# Patient Record
Sex: MALE | URBAN - METROPOLITAN AREA
[De-identification: names, ages, dates, MRNs, and addresses within clinical notes are randomized per-mention and may not be internally consistent; named-entity substitution may affect disease eponyms.]

---

## 2018-01-01 ENCOUNTER — ANTICOAGULATION THERAPY VISIT (OUTPATIENT)
Dept: NURSING | Facility: CLINIC | Age: 83
End: 2018-01-01

## 2018-01-01 ENCOUNTER — TELEPHONE (OUTPATIENT)
Dept: NURSING | Facility: CLINIC | Age: 83
End: 2018-01-01

## 2018-01-01 ENCOUNTER — ANTICOAGULATION THERAPY VISIT (OUTPATIENT)
Dept: NURSING | Facility: CLINIC | Age: 83
End: 2018-01-01
Payer: COMMERCIAL

## 2018-01-01 ENCOUNTER — TELEPHONE (OUTPATIENT)
Dept: FAMILY MEDICINE | Facility: CLINIC | Age: 83
End: 2018-01-01

## 2018-01-01 ENCOUNTER — OFFICE VISIT (OUTPATIENT)
Dept: FAMILY MEDICINE | Facility: CLINIC | Age: 83
End: 2018-01-01
Payer: COMMERCIAL

## 2018-01-01 ENCOUNTER — OFFICE VISIT (OUTPATIENT)
Dept: DERMATOLOGY | Facility: CLINIC | Age: 83
End: 2018-01-01
Payer: COMMERCIAL

## 2018-01-01 ENCOUNTER — TELEPHONE (OUTPATIENT)
Dept: DERMATOLOGY | Facility: CLINIC | Age: 83
End: 2018-01-01

## 2018-01-01 VITALS
WEIGHT: 200 LBS | HEIGHT: 72 IN | OXYGEN SATURATION: 98 % | BODY MASS INDEX: 27.09 KG/M2 | HEART RATE: 92 BPM | DIASTOLIC BLOOD PRESSURE: 80 MMHG | SYSTOLIC BLOOD PRESSURE: 130 MMHG | TEMPERATURE: 97.5 F

## 2018-01-01 VITALS — SYSTOLIC BLOOD PRESSURE: 158 MMHG | HEART RATE: 80 BPM | DIASTOLIC BLOOD PRESSURE: 70 MMHG | OXYGEN SATURATION: 100 %

## 2018-01-01 DIAGNOSIS — D48.5 NEOPLASM OF UNCERTAIN BEHAVIOR OF SKIN: Primary | ICD-10-CM

## 2018-01-01 DIAGNOSIS — I50.32 CHRONIC DIASTOLIC HF (HEART FAILURE) (H): ICD-10-CM

## 2018-01-01 DIAGNOSIS — D48.5 NEOPLASM OF UNCERTAIN BEHAVIOR OF SKIN: ICD-10-CM

## 2018-01-01 DIAGNOSIS — M54.2 CERVICALGIA: ICD-10-CM

## 2018-01-01 DIAGNOSIS — L57.0 AK (ACTINIC KERATOSIS): Primary | ICD-10-CM

## 2018-01-01 DIAGNOSIS — L57.8 SOLAR ELASTOSIS: ICD-10-CM

## 2018-01-01 DIAGNOSIS — Z85.828 HISTORY OF SKIN CANCER: ICD-10-CM

## 2018-01-01 DIAGNOSIS — I48.20 CHRONIC ATRIAL FIBRILLATION (H): Primary | ICD-10-CM

## 2018-01-01 DIAGNOSIS — I10 ESSENTIAL HYPERTENSION: ICD-10-CM

## 2018-01-01 LAB
COPATH REPORT: NORMAL
INR POINT OF CARE: 2.8 (ref 0.86–1.14)
INR PPP: 2.1
INR PPP: 2.8 (ref 0.86–1.14)

## 2018-01-01 PROCEDURE — 99214 OFFICE O/P EST MOD 30 MIN: CPT | Performed by: FAMILY MEDICINE

## 2018-01-01 PROCEDURE — 11100 HC DESTRUCT PREMALIGNANT LESION, FIRST: CPT | Mod: 59 | Performed by: PHYSICIAN ASSISTANT

## 2018-01-01 PROCEDURE — 11101 HC BIOPSY SKIN/SUBQ/MUC MEM, SINGLE LESION: CPT | Performed by: PHYSICIAN ASSISTANT

## 2018-01-01 PROCEDURE — 17000 DESTRUCT PREMALG LESION: CPT | Mod: 51 | Performed by: PHYSICIAN ASSISTANT

## 2018-01-01 PROCEDURE — 88305 TISSUE EXAM BY PATHOLOGIST: CPT | Mod: TC | Performed by: PHYSICIAN ASSISTANT

## 2018-01-01 PROCEDURE — 17003 DESTRUCT PREMALG LES 2-14: CPT | Performed by: PHYSICIAN ASSISTANT

## 2018-01-01 PROCEDURE — 99203 OFFICE O/P NEW LOW 30 MIN: CPT | Mod: 25 | Performed by: PHYSICIAN ASSISTANT

## 2018-01-01 PROCEDURE — 36416 COLLJ CAPILLARY BLOOD SPEC: CPT | Performed by: FAMILY MEDICINE

## 2018-01-01 PROCEDURE — 85610 PROTHROMBIN TIME: CPT | Performed by: FAMILY MEDICINE

## 2018-01-01 ASSESSMENT — PAIN SCALES - GENERAL: PAINLEVEL: NO PAIN (0)

## 2018-01-19 ENCOUNTER — MEDICAL CORRESPONDENCE (OUTPATIENT)
Dept: HEALTH INFORMATION MANAGEMENT | Facility: CLINIC | Age: 83
End: 2018-01-19

## 2018-01-22 ENCOUNTER — MEDICAL CORRESPONDENCE (OUTPATIENT)
Dept: HEALTH INFORMATION MANAGEMENT | Facility: CLINIC | Age: 83
End: 2018-01-22

## 2018-01-24 ENCOUNTER — MEDICAL CORRESPONDENCE (OUTPATIENT)
Dept: HEALTH INFORMATION MANAGEMENT | Facility: CLINIC | Age: 83
End: 2018-01-24

## 2018-01-29 ENCOUNTER — OFFICE VISIT (OUTPATIENT)
Dept: FAMILY MEDICINE | Facility: CLINIC | Age: 83
End: 2018-01-29
Payer: COMMERCIAL

## 2018-01-29 VITALS
SYSTOLIC BLOOD PRESSURE: 118 MMHG | HEART RATE: 60 BPM | TEMPERATURE: 98 F | DIASTOLIC BLOOD PRESSURE: 60 MMHG | OXYGEN SATURATION: 99 % | WEIGHT: 195.6 LBS

## 2018-01-29 DIAGNOSIS — J18.9 PNEUMONIA DUE TO INFECTIOUS ORGANISM, UNSPECIFIED LATERALITY, UNSPECIFIED PART OF LUNG: Primary | ICD-10-CM

## 2018-01-29 DIAGNOSIS — C61 PROSTATE CANCER (H): ICD-10-CM

## 2018-01-29 DIAGNOSIS — I10 ESSENTIAL HYPERTENSION: ICD-10-CM

## 2018-01-29 DIAGNOSIS — I48.20 CHRONIC ATRIAL FIBRILLATION (H): ICD-10-CM

## 2018-01-29 PROCEDURE — 99203 OFFICE O/P NEW LOW 30 MIN: CPT | Performed by: FAMILY MEDICINE

## 2018-01-29 RX ORDER — LISINOPRIL 10 MG/1
10 TABLET ORAL AT BEDTIME
COMMUNITY

## 2018-01-29 RX ORDER — METOPROLOL SUCCINATE 25 MG/1
25 TABLET, EXTENDED RELEASE ORAL AT BEDTIME
COMMUNITY

## 2018-01-29 RX ORDER — WARFARIN SODIUM 2.5 MG/1
2.5 TABLET ORAL DAILY
Qty: 90 TABLET | Refills: 3 | Status: SHIPPED | OUTPATIENT
Start: 2018-01-29 | End: 2019-01-01

## 2018-01-29 RX ORDER — LACTOBACILLUS RHAMNOSUS GG 10B CELL
1 CAPSULE ORAL DAILY
COMMUNITY
End: 2019-01-01

## 2018-01-29 RX ORDER — ATORVASTATIN CALCIUM 20 MG/1
20 TABLET, FILM COATED ORAL AT BEDTIME
COMMUNITY

## 2018-01-29 RX ORDER — WARFARIN SODIUM 2.5 MG/1
2.5 TABLET ORAL DAILY
COMMUNITY
End: 2018-01-29

## 2018-01-29 NOTE — NURSING NOTE
Chief Complaint   Patient presents with     [facility]     F/u Nursing Home       Initial /60 (BP Location: Right arm, Patient Position: Sitting, Cuff Size: Adult Large)  Pulse 60  Temp 98  F (36.7  C) (Oral)  Wt 195 lb 9.6 oz (88.7 kg)  SpO2 99% There is no height or weight on file to calculate BMI.  Medication Reconciliation: complete

## 2018-01-29 NOTE — PROGRESS NOTES
SUBJECTIVE:   Bret Merino is a 89 year old male who presents to clinic today for the following health issues:      New Patient/Transfer of Care      Hospital Follow-up Visit:    Hospital/Nursing Home/ Rehab Facility: [facility]  Date of Admission: 1/5/2018  Date of Discharge: 1/10/2018  Reason(s) for Admission: fall and pneumonia            Problems taking medications regularly:  None       Medication changes since discharge: None       Problems adhering to non-medication therapy:  None     Summary of hospitalization:  [facility] discharge summary reviewed  Discharge summary unavailable  Diagnostic Tests/Treatments reviewed.  Follow up needed: discharged to [facility] and was seen by in house doctor there until discharge to home on 1/22/2018  Other Healthcare Providers Involved in Patient s Care:         cardiology at [facility], long term  Update since discharge: improved.      Post Discharge Medication Reconciliation: discharge medications reconciled, continue medications without change.  Plan of care communicated with patient and family     Coding guidelines for this visit:  Type of Medical   Decision Making Face-to-Face Visit       within 7 Days of discharge Face-to-Face Visit        within 14 days of discharge   Moderate Complexity [id number]   High Complexity [id number]          This is a patient who is here with his son today.  I have met him once previously and he is here to establish care.  He lives in a senior apartment building/assisted living facility in [facility].  His son sees him on a regular basis and sets up all of his medications.  Recently the patient was hospitalized after a fall and diagnosed with pneumonia.  He was inpatient at [facility] from January 5 - January 10 and then discharged to [facility] where he remained for rehabilitation until her discharge home on January 22, 2018.  Records from his recent hospitalization  and my previous interaction with him are not available through care everywhere.  Long-term medications have been stable and were updated in our electronic record today.  He was discharged from [facility] on Eliquis but this is too costly and he would like to go back on warfarin at this time.  Previously there was a service he would come to his home to draw blood for the INR and the son would like to get that set back up.  He is not had any major bleeding complications and did not have any type of embolic event to suggest failure of warfarin therapy.  Today, Lonnie, denies any pain.  He said his appetite is good.  He has not had any recent falls.  He is continent of bowel and bladder and this is confirmed by his son.  Some intermittent problems with constipation.  He just says his energy level is not quite what he would like it to be and is not sure if that is just attributed to his age or his recent illness.        Problem list and histories reviewed & adjusted, as indicated.  Additional history: as documented    Patient Active Problem List   Diagnosis     Afib (H)     Anticoagulant long-term use     Atrial flutter (H)     Coronary atherosclerosis     Cardiac conduction disorder     Chronic diastolic HF (heart failure) (H)     DJD (degenerative joint disease)     Dyslipidemia     HTN (hypertension)     ICD (implantable cardioverter-defibrillator), biventricular, in situ     Post PTCA     Pre-diabetes     Prostate cancer (H)     Past Surgical History:   Procedure Laterality Date     ARTHROPLASTY KNEE BILATERAL       ARTHROPLASTY SHOULDER Right      AS TOTAL HIP ARTHROPLASTY Right      H CORONARY INTERVENTION      stentin for CAD     IMPLANT PACEMAKER       ORCHIECTOMY SCROTAL BILATERAL      treatment of prostate cancer       Social History   Substance Use Topics     Smoking status: Never Smoker     Smokeless tobacco: Never Used     Alcohol use No     History reviewed. No pertinent family history.      Current  Outpatient Prescriptions   Medication Sig Dispense Refill     lactobacillus rhamnosus, GG, (CULTURELL) capsule Take 1 capsule by mouth daily       atorvastatin (LIPITOR) 20 MG tablet Take 20 mg by mouth daily       lisinopril (PRINIVIL/ZESTRIL) 10 MG tablet Take 10 mg by mouth daily       metoprolol succinate (TOPROL-XL) 25 MG 24 hr tablet Take 25 mg by mouth daily       warfarin (JANTOVEN) 2.5 MG tablet Take 1 tablet (2.5 mg) by mouth daily As directed 90 tablet 3     [DISCONTINUED] warfarin (JANTOVEN) 2.5 MG tablet Take 2.5 mg by mouth daily As directed       Allergies   Allergen Reactions     Aleve [Naproxen]      BP Readings from Last 3 Encounters:   01/29/18 118/60    Wt Readings from Last 3 Encounters:   01/29/18 195 lb 9.6 oz (88.7 kg)                    Reviewed and updated as needed this visit by clinical staff  Tobacco  Allergies  Meds  Problems  Med Hx  Surg Hx  Fam Hx  Soc Hx        Reviewed and updated as needed this visit by Provider  Tobacco  Allergies  Meds  Problems  Med Hx  Surg Hx  Fam Hx  Soc Hx          ROS:  Constitutional, HEENT, cardiovascular, pulmonary, gi and gu systems are negative, except as otherwise noted.    OBJECTIVE:     /60 (BP Location: Right arm, Patient Position: Sitting, Cuff Size: Adult Large)  Pulse 60  Temp 98  F (36.7  C) (Oral)  Wt 195 lb 9.6 oz (88.7 kg)  SpO2 99%  There is no height or weight on file to calculate BMI.  GENERAL: healthy, alert and no distress  EYES: Eyes grossly normal to inspection, PERRL and conjunctivae and sclerae normal  NECK: no adenopathy, no asymmetry, masses, or scars and thyroid normal to palpation  RESP: lungs clear to auscultation - no rales, rhonchi or wheezes  CV: regular rate and rhythm, normal S1 S2, no S3 or S4, no murmur, click or rub, no peripheral edema and peripheral pulses strong  ABDOMEN: soft, nontender, no hepatosplenomegaly, no masses and bowel sounds normal  MS: no gross musculoskeletal defects noted, no  edema  NEURO: Normal strength and tone, mentation intact and speech normal  PSYCH: mentation appears normal, affect normal/bright    Diagnostic Test Results:  none     ASSESSMENT/PLAN:             1. Pneumonia due to infectious organism, unspecified laterality, unspecified part of lung  Clinically his pneumonia has cleared.  Indications for follow-up were briefly reviewed.  Release form to get old records from [facility] was completed today.    2. Chronic atrial fibrillation (H)  He has a biventricular pacemaker in.  This was most recently interrogated in early January 2018.  It showed that he is paced approximately 94% of the time.  His pulse is regular today as his his heartbeat to auscultation.  If he remains in atrial fibrillation then anticoagulation seems appropriate.  They are going to finish the Eliquis and then start his warfarin at 2.5 mg daily which had been a chronic stable dose.  The son will work on getting INR checks set up and they were able to meet briefly today with the INR nurse at the office.  A consent to communicate form has been completed to allow INR results and any medication changes to be communicated to his son Len.  - warfarin (JANTOVEN) 2.5 MG tablet; Take 1 tablet (2.5 mg) by mouth daily As directed  Dispense: 90 tablet; Refill: 3    3. Essential hypertension  Under control at this time.  No refills were needed.    4. Prostate cancer (H)  Asymptomatic at this time.  Has had bilateral orchiectomy for treatment in the past.      FUTURE APPOINTMENTS:       - Follow-up visit in 4 weeks    Ad Brenner MD  [facility]

## 2018-01-29 NOTE — PATIENT INSTRUCTIONS
[facility]   Discharged by : payal  Paper scripts provided to patient : none     If you have any questions regarding your visit please contact your care team:     Team Gold                Clinic Hours Telephone Number     Dr. Alma Rosa Quispe 7am-7pm  Monday - Thursday   7am-5pm  Fridays  [phone]   (Appointment scheduling available 24/7)     RN Line  [phone] option 2     [facility] [place] [facility] 11am-9pm Monday-Friday Saturday-Sunday- 9am-5pm     [place] -   5pm-9pm Monday-Friday Saturday-Sunday- 9am-5pm    [phone] - [place]    [phone] - [place]       For a Price Quote for your services, please call our [email or web address] Price Line at [phone].     What options do I have for visits at the clinic other than the traditional office visit?     To expand how we care for you, many of our providers are utilizing electronic visits (e-visits) and telephone visits, when medically appropriate, for interactions with their patients rather than a visit in the clinic. We also offer nurse visits for many medical concerns. Just like any other service, we will bill your insurance company for this type of visit based on time spent on the phone with your provider. Not all insurance companies cover these visits. Please check with your medical insurance if this type of visit is covered. You will be responsible for any charges that are not paid by your insurance.   E-visits via [employer]: generally incur a $35.00 fee.     Telephone visits:   Time spent on the phone: *charged based on time that is spent on the phone in increments of 10 minutes. Estimated cost:   5-10 mins $30.00   11-20 mins. $59.00   21-30 mins. $85.00     Use [employer]ather (secure email communication and access to your chart) to send your primary care provider a message or make an appointment. Ask someone on your  Team how to sign up for [employer].     As always, Thank you for trusting us with your health care needs!      [facility]:    Scheduling Appointments  [place] [place]  Call: [phone]    [facility] Esc[facility]  Call: [phone]    [facility]  Call: [phone]    For Gastroenterology referrals   [facility]   Clinics and Surgery Center, 4th Floor   [address] [place] [address]   Appointments: [phone]    WHERE TO GO FOR CARE?  Clinic    Make an appointment if you:       Are sick (cold, cough, flu, sore throat, earache or in pain).       Have a small injury (sprain, small cut, burn or broken bone).       Need a physical exam, Pap smear, vaccine or prescription refill.       Have questions about your health or medicines.    To reach us:      Call [phone] ([phone]). Open 24 hours every day. (For counseling services, call [phone].)    Log into Dartfish at [email or web address]. (Visit [email or web address] to create an account.) Hospital emergency room    An emergency is a serious or life- threatening problem that must be treated right away.    Call 355 or get to the hospital if you have:      Very bad or sudden:            - Chest pain or pressure         - Bleeding         - Head or belly pain         - Dizziness or trouble seeing, walking or                          Speaking      Problems breathing      Blood in your vomit or you are coughing up blood      A major injury (knocked out, loss of a finger or limb, rape, broken bone protruding from skin)    A mental health crisis. (Or call the Mental Health Crisis line at 1-321.863.2404 or Suicide Prevention Hotline at 1-935.141.6270.)    Open 24 hours every day. You don't need an appointment.     Urgent care    Visit urgent care for sickness or small injuries when the clinic is closed. You don't need an appointment. To check hours or find an  urgent care near you, visit [email or web address]. Online care    Get online care from [facility] for more than 70 common problems, like colds, allergies and infections. Open 24 hours every day at:   [email or web address]   Need help deciding?    For advice about where to be seen, you may call your clinic and ask to speak with a nurse. We're here for you 24 hours every day.         If you are deaf or hard of hearing, please let us know. We provide many free services including sign language interpreters, oral interpreters, TTYs, telephone amplifiers, note takers and written materials.

## 2018-02-09 ENCOUNTER — TELEPHONE (OUTPATIENT)
Dept: FAMILY MEDICINE | Facility: CLINIC | Age: 83
End: 2018-02-09

## 2018-02-09 DIAGNOSIS — I48.20 CHRONIC ATRIAL FIBRILLATION (H): Primary | ICD-10-CM

## 2018-02-09 NOTE — TELEPHONE ENCOUNTER
Reason for Call: Request for an order or referral:    Order or referral being requested: Restart INR care    Date needed: as soon as possible    Has the patient been seen by the PCP for this problem? Unsure    Additional comments: Ling calling from [facility].  Patient's son called them to re-start INR care.  They are looking for an order.  Would like a standing order with a beginning and end date.  Please fax to [phone].  Please call with questions.    Phone number Patient can be reached at:  Other phone number:  [phone]    Best Time:  any    Can we leave a detailed message on this number?  YES    Call taken on 2/9/2018 at 12:17 PM by Kathia Zhou

## 2018-02-12 NOTE — TELEPHONE ENCOUNTER
INR referral order placed that can be faxed to the home care company doing the lab draws.    Ad Brenner MD

## 2018-02-12 NOTE — TELEPHONE ENCOUNTER
Tc please fax referral to [facility] [phone]  Shelley Ceron,Clinic Rn  [place]

## 2018-02-21 ENCOUNTER — MEDICAL CORRESPONDENCE (OUTPATIENT)
Dept: HEALTH INFORMATION MANAGEMENT | Facility: CLINIC | Age: 83
End: 2018-02-21

## 2018-02-26 ENCOUNTER — OFFICE VISIT (OUTPATIENT)
Dept: FAMILY MEDICINE | Facility: CLINIC | Age: 83
End: 2018-02-26
Payer: COMMERCIAL

## 2018-02-26 VITALS
SYSTOLIC BLOOD PRESSURE: 138 MMHG | WEIGHT: 193.2 LBS | DIASTOLIC BLOOD PRESSURE: 70 MMHG | RESPIRATION RATE: 20 BRPM | BODY MASS INDEX: 26.17 KG/M2 | HEIGHT: 72 IN | HEART RATE: 82 BPM | TEMPERATURE: 97.7 F | OXYGEN SATURATION: 98 %

## 2018-02-26 DIAGNOSIS — I50.32 CHRONIC DIASTOLIC HF (HEART FAILURE) (H): ICD-10-CM

## 2018-02-26 DIAGNOSIS — M47.812 SPONDYLOSIS OF CERVICAL REGION WITHOUT MYELOPATHY OR RADICULOPATHY: ICD-10-CM

## 2018-02-26 DIAGNOSIS — I48.20 CHRONIC ATRIAL FIBRILLATION (H): Primary | ICD-10-CM

## 2018-02-26 DIAGNOSIS — Z79.01 ANTICOAGULANT LONG-TERM USE: ICD-10-CM

## 2018-02-26 PROCEDURE — 99214 OFFICE O/P EST MOD 30 MIN: CPT | Performed by: FAMILY MEDICINE

## 2018-02-26 ASSESSMENT — PAIN SCALES - GENERAL: PAINLEVEL: SEVERE PAIN (6)

## 2018-02-26 NOTE — MR AVS SNAPSHOT
"              After Visit Summary   2/26/2018    Bret Merino    MRN: [id number]           Patient Information     Date Of Birth          6/17/1925        Visit Information        Provider Department      2/26/2018 10:00 AM Ad Brenner MD [facility]        Today's Diagnoses     Chronic atrial fibrillation (H)    -  1    Anticoagulant long-term use        Chronic diastolic HF (heart failure) (H)        Spondylosis of cervical region without myelopathy or radiculopathy           Follow-ups after your visit        Follow-up notes from your care team     Return in about 3 months (around 5/26/2018) for Routine Visit.      Who to contact     If you have questions or need follow up information about today's clinic visit or your schedule please contact [facility] directly at [phone].  Normal or non-critical lab and imaging results will be communicated to you by MyChart, letter or phone within 4 business days after the clinic has received the results. If you do not hear from us within 7 days, please contact the clinic through MyChart or phone. If you have a critical or abnormal lab result, we will notify you by phone as soon as possible.  Submit refill requests through [employer] or call your pharmacy and they will forward the refill request to us. Please allow 3 business days for your refill to be completed.          Additional Information About Your Visit        MyChart Information     [employer] lets you send messages to your doctor, view your test results, renew your prescriptions, schedule appointments and more. To sign up, go to [email or web address] . Click on \"Log in\" on the left side of the screen, which will take you to the Welcome page. Then click on \"Sign up Now\" on the right side of the page.     You will be asked to enter the access code listed below, as well as some personal information. Please follow the directions to create your username and password.   "   Your access code is: [email or web address]  Expires: 2018 11:16 AM     Your access code will  in 90 days. If you need help or a new code, please call your [place] clinic or [phone].        Care EveryWhere ID     This is your Care EveryWhere ID. This could be used by other organizations to access your [place] medical records  [id number]        Your Vitals Were     Pulse Temperature Respirations Height Pulse Oximetry BMI (Body Mass Index)    82 97.7  F (36.5  C) (Tympanic) 20 6' (1.829 m) 98% 26.2 kg/m2       Blood Pressure from Last 3 Encounters:   18 138/70   18 118/60    Weight from Last 3 Encounters:   18 193 lb 3.2 oz (87.6 kg)   18 195 lb 9.6 oz (88.7 kg)              Today, you had the following     No orders found for display       Primary Care Provider Office Phone # Fax #    Ad Brenner [phone]       [address] [address]        Equal Access to Services     [facility]: Hadii aad ku hadasho Soomaali, waaxda luqadaha, qaybta kaalmada adeegyada, amber rtinidad haygerryn yessenia beebe . So [facility] [phone].    ATENCIÓN: Si habla español, tiene a medina disposición servicios gratuitos de asistencia lingüística. Llame al [phone].    We comply with applicable federal civil rights laws and [place] laws. We do not discriminate on the basis of race, color, national origin, age, disability, sex, sexual orientation, or gender identity.            Thank you!     Thank you for choosing [facility]  for your care. Our goal is always to provide you with excellent care. Hearing back from our patients is one way we can continue to improve our services. Please take a few minutes to complete the written survey that you may receive in the mail after your visit with us. Thank you!             Your Updated Medication List - Protect others around you: Learn how to safely use, store and throw away your medicines  at www.disposemymeds.org.          This list is accurate as of 2/26/18 10:33 AM.  Always use your most recent med list.                   Brand Name Dispense Instructions for use Diagnosis    ASPIRIN NOT PRESCRIBED    INTENTIONAL    0 each    Please choose reason not prescribed, below    Chronic atrial fibrillation (H)       lactobacillus rhamnosus (GG) capsule      Take 1 capsule by mouth daily        LIPITOR 20 MG tablet   Generic drug:  atorvastatin      Take 20 mg by mouth daily        lisinopril 10 MG tablet    PRINIVIL/ZESTRIL     Take 10 mg by mouth daily        metoprolol succinate 25 MG 24 hr tablet    TOPROL-XL     Take 25 mg by mouth daily        warfarin 2.5 MG tablet    JANTOVEN    90 tablet    Take 1 tablet (2.5 mg) by mouth daily As directed    Chronic atrial fibrillation (H)

## 2018-02-26 NOTE — NURSING NOTE
Chief Complaint   Patient presents with     Pain     neck pain/ hx of neck fracture        Initial /70 (BP Location: Right arm, Patient Position: Chair, Cuff Size: Adult Large)  Pulse 82  Temp 97.7  F (36.5  C) (Tympanic)  Resp 20  Ht 6' (1.829 m)  Wt 193 lb 3.2 oz (87.6 kg)  SpO2 98%  BMI 26.2 kg/m2 Estimated body mass index is 26.2 kg/(m^2) as calculated from the following:    Height as of this encounter: 6' (1.829 m).    Weight as of this encounter: 193 lb 3.2 oz (87.6 kg).  Medication Reconciliation: complete       Olivia Chapman CMA

## 2018-02-26 NOTE — PROGRESS NOTES
SUBJECTIVE:   Bret Merino is a 89 year old male who presents to clinic today for the following health issues:      Recheck from last visit,     INR done 2/20/2018, result per patient 3.3        Amount of exercise or physical activity: 4-5 days/week for an average of 15-30 minutes    Problems taking medications regularly: No    Medication side effects: none    Diet: regular (no restrictions)    Patient is here for a recheck today.  He is doing well and son feels he is pretty much back to normal at this time.  Had INR done on 2/20 and there was some confusion as to where the test results went so son will be working on that.  Appetite good.  Sleep good.  Some axial neck pain on the left side bothers at times and improved with tylenol.  No radicular symptoms, no incontinence.  No other concerns noted today.      Problem list and histories reviewed & adjusted, as indicated.  Additional history: as documented    Patient Active Problem List   Diagnosis     Afib (H)     Anticoagulant long-term use     Atrial flutter (H)     Coronary atherosclerosis     Cardiac conduction disorder     Chronic diastolic HF (heart failure) (H)     DJD (degenerative joint disease)     Dyslipidemia     HTN (hypertension)     ICD (implantable cardioverter-defibrillator), biventricular, in situ     Post PTCA     Pre-diabetes     Prostate cancer (H)     Past Surgical History:   Procedure Laterality Date     ARTHROPLASTY KNEE BILATERAL       ARTHROPLASTY SHOULDER Right      AS TOTAL HIP ARTHROPLASTY Right      H CORONARY INTERVENTION      stentin for CAD     IMPLANT PACEMAKER       ORCHIECTOMY SCROTAL BILATERAL      treatment of prostate cancer       Social History   Substance Use Topics     Smoking status: Never Smoker     Smokeless tobacco: Never Used     Alcohol use No     No family history on file.      Current Outpatient Prescriptions   Medication Sig Dispense Refill     lactobacillus rhamnosus, GG, (CULTURELL) capsule Take 1 capsule by  mouth daily       atorvastatin (LIPITOR) 20 MG tablet Take 20 mg by mouth daily       lisinopril (PRINIVIL/ZESTRIL) 10 MG tablet Take 10 mg by mouth daily       metoprolol succinate (TOPROL-XL) 25 MG 24 hr tablet Take 25 mg by mouth daily       warfarin (JANTOVEN) 2.5 MG tablet Take 1 tablet (2.5 mg) by mouth daily As directed 90 tablet 3     ASPIRIN NOT PRESCRIBED (INTENTIONAL) Please choose reason not prescribed, below 0 each 0     Allergies   Allergen Reactions     Aleve [Naproxen]        Reviewed and updated as needed this visit by clinical staff  Tobacco  Allergies  Meds  Problems       Reviewed and updated as needed this visit by Provider  Allergies  Meds  Problems         ROS:  Constitutional, HEENT, cardiovascular, pulmonary, gi and gu systems are negative, except as otherwise noted.    OBJECTIVE:     /70 (BP Location: Right arm, Patient Position: Chair, Cuff Size: Adult Large)  Pulse 82  Temp 97.7  F (36.5  C) (Tympanic)  Resp 20  Ht 6' (1.829 m)  Wt 193 lb 3.2 oz (87.6 kg)  SpO2 98%  BMI 26.2 kg/m2  Body mass index is 26.2 kg/(m^2).  GENERAL: healthy, alert and no distress  EYES: Eyes grossly normal to inspection, PERRL and conjunctivae and sclerae normal  NECK: no adenopathy, no asymmetry, masses, or scars and thyroid normal to palpation  RESP: lungs clear to auscultation - no rales, rhonchi or wheezes  CV: regular rate and rhythm, normal S1 S2, no S3 or S4, no murmur, click or rub, no peripheral edema and peripheral pulses strong  MS: no gross musculoskeletal defects noted, no edema  NEURO: Normal strength and tone, mentation intact and speech normal  PSYCH: mentation appears normal, affect normal/bright    Diagnostic Test Results:  none     ASSESSMENT/PLAN:             1. Chronic atrial fibrillation (H)  Clinically in rate control at this time.  On coumadin and will work on getting results sent to our office for management.    2. Anticoagulant long-term use  As above.    3. Chronic  diastolic HF (heart failure) (H)  No evidence of fluid overload at this time.    4. Spondylosis of cervical region without myelopathy or radiculopathy  May continue with prn tylenol, likely arthritic in nature based on symptoms today.  Imaging was deferred due to lack of concerning symptoms and improvement with tylenol.      FUTURE APPOINTMENTS:       - Follow-up visit in 3 months    Ad Brenner MD  [facility]

## 2018-03-19 ENCOUNTER — TELEPHONE (OUTPATIENT)
Dept: NURSING | Facility: CLINIC | Age: 83
End: 2018-03-19

## 2018-03-19 NOTE — TELEPHONE ENCOUNTER
Spoke to [facility] they will be drawing inr tomorrow then calling with results, hours and phone number provided. Then spoke to Len the son we will call dose to him.  Shelley Ceron,Clinic Rn  [place]

## 2018-03-21 ENCOUNTER — TELEPHONE (OUTPATIENT)
Dept: FAMILY MEDICINE | Facility: CLINIC | Age: 83
End: 2018-03-21

## 2018-03-21 NOTE — TELEPHONE ENCOUNTER
Forms received from [facility]/ Lab Specimen Acquisition- Lab Order Authorization Request- Finger stick or venipuncture to obtain lab specimen  for Ad Brenner MD.  Forms placed in provider 'sign me' folder.  Please fax forms to [phone] after completion.    Adamaris Van  Patient Representative

## 2018-04-03 ENCOUNTER — TELEPHONE (OUTPATIENT)
Dept: FAMILY MEDICINE | Facility: CLINIC | Age: 83
End: 2018-04-03

## 2018-04-03 NOTE — TELEPHONE ENCOUNTER
Forms received from: [employer]   Phone number listed: [phone]   Fax listed: [phone]  Date received: 4-3-18  Form description: Admission Orders: RN 1 week 1, 2 week 2, 1 week 6  Once forms are completed, please return to [employer] via fax.  Is patient requesting to be contacted when forms are completed: n/a  Form placed: Provider folder    Yusra Dowd

## 2018-04-06 NOTE — TELEPHONE ENCOUNTER
Form was returned because it was unsigned.  Dr Brenner signed all the rest that were stapled together but missed this one form    Plan of Care (01/24/18-03/24/18)    Placed in Dr Calvert sign me box.    Adamaris Van

## 2018-05-08 ENCOUNTER — TELEPHONE (OUTPATIENT)
Dept: FAMILY MEDICINE | Facility: CLINIC | Age: 83
End: 2018-05-08

## 2018-05-08 NOTE — TELEPHONE ENCOUNTER
Forms received from [facility]/ Discharge of Physical Therapy effective as of 02/21/18 for Ad Brenner MD.  Forms placed in provider 'sign me' folder.  Please fax forms to [phone] after completion.    Adamaris Van  Patient Representative

## 2018-05-08 NOTE — TELEPHONE ENCOUNTER
Forms received [facility]/ 2 orders    1)physician orders - son declining SLP intervention - DC SLP eval and treat order         2)Skilled nursing Discharge - effective as of 01/29/2018  for Ad Brenner MD.  Forms placed in provider 'sign me' folder.  Please fax forms to [phone] after completion.    Adamaris Van  Patient Representative

## 2018-05-23 NOTE — MR AVS SNAPSHOT
After Visit Summary   5/23/2018    Bret Merino    MRN: [id number]           Patient Information     Date Of Birth          6/17/1925        Visit Information        Provider Department      5/23/2018 10:20 AM Ad Brenner MD [facility]        Today's Diagnoses     Chronic atrial fibrillation (H)    -  1    Essential hypertension        Chronic diastolic HF (heart failure) (H)        Cervicalgia          Care Instructions    Today your INR was 2.8.    Stay on the same dose and recheck in about one month.    [facility]   Discharged by : payal  Paper scripts provided to patient : none     If you have any questions regarding your visit please contact your care team:     Team Gold                Clinic Hours Telephone Number     Dr. Alma Rosa Collazo, NP 7am-7pm  Monday - Thursday   7am-5pm  Fridays  [phone]   (Appointment scheduling available 24/7)     RN Line  [phone] option 2     Urgent Care - [place] and [facility] - 11am-9pm Monday-Friday Saturday-Sunday- 9am-5pm     [place] -   5pm-9pm Monday-Friday Saturday-Sunday- 9am-5pm    [phone] - [place]    [phone] - [place]       For a Price Quote for your services, please call our Consumer Price Line at [phone].     What options do I have for visits at the clinic other than the traditional office visit?     To expand how we care for you, many of our providers are utilizing electronic visits (e-visits) and telephone visits, when medically appropriate, for interactions with their patients rather than a visit in the clinic. We also offer nurse visits for many medical concerns. Just like any other service, we will bill your insurance company for this type of visit based on time spent on the phone with your provider. Not all insurance companies cover these visits. Please  check with your medical insurance if this type of visit is covered. You will be responsible for any charges that are not paid by your insurance.   E-visits via StrikeIronhart: generally incur a $35.00 fee.     Telephone visits:  Time spent on the phone: *charged based on time that is spent on the phone in increments of 10 minutes. Estimated cost:   5-10 mins $30.00   11-20 mins. $59.00   21-30 mins. $85.00       Use Bitstrips (secure email communication and access to your chart) to send your primary care provider a message or make an appointment. Ask someone on your Team how to sign up for Bitstrips.     As always, Thank you for trusting us with your health care needs!      [facility]:    Scheduling Appointments  [place] [place]  Call: [phone]    [facility]  Call: [phone]    [facility]  Call: [phone]    For Gastroenterology referrals   [facility] Gastroenterology   Clinics and Surgery Center, 4th Floor   [address] [place] [address]   Appointments: [phone]    WHERE TO GO FOR CARE?  Clinic    Make an appointment if you:       Are sick (cold, cough, flu, sore throat, earache or in pain).       Have a small injury (sprain, small cut, burn or broken bone).       Need a physical exam, Pap smear, vaccine or prescription refill.       Have questions about your health or medicines.    To reach us:      Call [phone] ([phone]). Open 24 hours every day. (For counseling services, call [phone].)    Log into Bitstrips at [email or web address]. (Visit [email or web address] to create an account.) Hospital emergency room    An emergency is a serious or life- threatening problem that must be treated right away.    Call [phone] or get to the hospital if you have:      Very bad or sudden:            - Chest pain or pressure         - Bleeding         - Head or belly pain         - Dizziness or trouble seeing,  walking or                          Speaking      Problems breathing      Blood in your vomit or you are coughing up blood      A major injury (knocked out, loss of a finger or limb, rape, broken bone protruding from skin)    A mental health crisis. (Or call the Mental Health Crisis line at 1-863.185.4909 or Suicide Prevention Hotline at 1-414.646.9064.)    Open 24 hours every day. You don't need an appointment.     Urgent care    Visit urgent care for sickness or small injuries when the clinic is closed. You don't need an appointment. To check hours or find an urgent care near you, visit [email or web address]. Online care    Get online care from [facility] for more than 70 common problems, like colds, allergies and infections. Open 24 hours every day at:   [email or web address]   Need help deciding?    For advice about where to be seen, you may call your clinic and ask to speak with a nurse. We're here for you 24 hours every day.         If you are deaf or hard of hearing, please let us know. We provide many free services including sign language interpreters, oral interpreters, TTYs, telephone amplifiers, note takers and written materials.                   Follow-ups after your visit        Follow-up notes from your care team     Return in about 3 months (around 8/23/2018) for Routine Visit.      Who to contact     If you have questions or need follow up information about today's clinic visit or your schedule please contact [facility] directly at [phone].  Normal or non-critical lab and imaging results will be communicated to you by MyChart, letter or phone within 4 business days after the clinic has received the results. If you do not hear from us within 7 days, please contact the clinic through MyChart or phone. If you have a critical or abnormal lab result, we will notify you by phone as soon as possible.  Submit refill requests through [employer] or call your pharmacy and they will forward the refill  request to us. Please allow 3 business days for your refill to be completed.          Additional Information About Your Visit        Care EveryWhere ID     This is your Care EveryWhere ID. This could be used by other organizations to access your [place] medical records  [id number]        Your Vitals Were     Pulse Temperature Height Pulse Oximetry BMI (Body Mass Index)       92 97.5  F (36.4  C) (Oral) 6' (1.829 m) 98% 27.12 kg/m2        Blood Pressure from Last 3 Encounters:   05/23/18 130/80   02/26/18 138/70   01/29/18 118/60    Weight from Last 3 Encounters:   05/23/18 200 lb (90.7 kg)   02/26/18 193 lb 3.2 oz (87.6 kg)   01/29/18 195 lb 9.6 oz (88.7 kg)              We Performed the Following     INR        Primary Care Provider Office Phone # Fax #    Ad Brenner [phone]       [address] [facility] [address]        Equal Access to Services     [facility] : Hadii aad ku hadasho Soomaali, waaxda luqadaha, qaybta kaalmada adeegyada, waxay idiin haygerryn yessenia beebe . So [facility] [phone].    ATENCIÓN: Si habla español, tiene a medina disposición servicios gratuitos de asistencia lingüística. Llame al [phone].    We comply with applicable federal civil rights laws and [place] laws. We do not discriminate on the basis of race, color, national origin, age, disability, sex, sexual orientation, or gender identity.            Thank you!     Thank you for choosing [facility] [place]  for your care. Our goal is always to provide you with excellent care. Hearing back from our patients is one way we can continue to improve our services. Please take a few minutes to complete the written survey that you may receive in the mail after your visit with us. Thank you!             Your Updated Medication List - Protect others around you: Learn how to safely use, store and throw away your medicines at www.disposemymeds.org.          This list is accurate  as of 5/23/18 11:06 AM.  Always use your most recent med list.                   Brand Name Dispense Instructions for use Diagnosis    ASPIRIN NOT PRESCRIBED    INTENTIONAL    0 each    Please choose reason not prescribed, below    Chronic atrial fibrillation (H)       lactobacillus rhamnosus (GG) capsule      Take 1 capsule by mouth daily        LIPITOR 20 MG tablet   Generic drug:  atorvastatin      Take 20 mg by mouth daily        lisinopril 10 MG tablet    PRINIVIL/ZESTRIL     Take 10 mg by mouth daily        metoprolol succinate 25 MG 24 hr tablet    TOPROL-XL     Take 25 mg by mouth daily        warfarin 2.5 MG tablet    JANTOVEN    90 tablet    Take 1 tablet (2.5 mg) by mouth daily As directed    Chronic atrial fibrillation (H)

## 2018-05-23 NOTE — PROGRESS NOTES
ANTICOAGULATION FOLLOW-UP CLINIC VISIT    Patient Name:  Bret Merino  Date:  5/23/2018  Contact Type:  Face to Face    SUBJECTIVE:     Patient Findings     Positives No Problem Findings           OBJECTIVE    INR   Date Value Ref Range Status   05/23/2018 2.80 (H) 0.86 - 1.14 Final     Comment:     This test is intended for monitoring Coumadin therapy.  Results are not   accurate in patients with prolonged INR due to factor deficiency.         ASSESSMENT / PLAN  INR assessment THER    Recheck INR In: 4 WEEKS    INR Location Clinic      Anticoagulation Summary as of 5/23/2018     INR goal 2.0-3.0    Today's INR 2.80    Warfarin maintenance plan 2.5 mg (2.5 mg x 1) every day    Full warfarin instructions 2.5 mg every day    Weekly warfarin total 17.5 mg    No change documented Kelby Miranda RN    Plan last modified Shelley Ceron RN (3/20/2018)    Next INR check 6/25/2018    Target end date       Anticoagulation Episode Summary     INR check location     Preferred lab     Send INR reminders to [facility]    Comments             See the Encounter Report to view Anticoagulation Flowsheet and Dosing Calendar (Go to Encounters tab in chart review, and find the Anticoagulation Therapy Visit)        Kelby Miranda, RN

## 2018-05-23 NOTE — PROGRESS NOTES
SUBJECTIVE:   Bret Merino is a 89 year old male who presents to clinic today for the following health issues:      Follow up from last visit.   INR last checked 3/20/18        Amount of exercise or physical activity: walking from room to dinning smith          Problems taking medications regularly: No    Medication side effects: none    Diet: regular (no restrictions)    He feels well at this time.  Some problems getting INR's done but not on our end, on the home service providers end.      Some neck pain.  Present for years intermittently and usually when he lies down in bed.  Tylenol helpful.  Right side in the post neck, no radiation.  Can interfere with falling asleep.  No injury although reports distant hx of cervical fracture.  Otherwise here with son and no other concerns were noted.  Patient feels quality of life is quite good and likes where he lives.  Engaged in daily activities at the facility but does not leave it much.        Problem list and histories reviewed & adjusted, as indicated.  Additional history: as documented    Patient Active Problem List   Diagnosis     Afib (H)     Anticoagulant long-term use     Atrial flutter (H)     Coronary atherosclerosis     Cardiac conduction disorder     Chronic diastolic HF (heart failure) (H)     DJD (degenerative joint disease)     Dyslipidemia     HTN (hypertension)     ICD (implantable cardioverter-defibrillator), biventricular, in situ     Post PTCA     Pre-diabetes     Prostate cancer (H)     Past Surgical History:   Procedure Laterality Date     ARTHROPLASTY KNEE BILATERAL       ARTHROPLASTY SHOULDER Right      AS TOTAL HIP ARTHROPLASTY Right      H CORONARY INTERVENTION      stentin for CAD     IMPLANT PACEMAKER       ORCHIECTOMY SCROTAL BILATERAL      treatment of prostate cancer       Social History   Substance Use Topics     Smoking status: Never Smoker     Smokeless tobacco: Never Used     Alcohol use No     History reviewed. No pertinent family  history.      Current Outpatient Prescriptions   Medication Sig Dispense Refill     ASPIRIN NOT PRESCRIBED (INTENTIONAL) Please choose reason not prescribed, below 0 each 0     atorvastatin (LIPITOR) 20 MG tablet Take 20 mg by mouth daily       lactobacillus rhamnosus, GG, (CULTURELL) capsule Take 1 capsule by mouth daily       lisinopril (PRINIVIL/ZESTRIL) 10 MG tablet Take 10 mg by mouth daily       metoprolol succinate (TOPROL-XL) 25 MG 24 hr tablet Take 25 mg by mouth daily       warfarin (JANTOVEN) 2.5 MG tablet Take 1 tablet (2.5 mg) by mouth daily As directed 90 tablet 3     Allergies   Allergen Reactions     Aleve [Naproxen]        Reviewed and updated as needed this visit by clinical staff  Tobacco  Allergies  Meds  Problems  Med Hx  Surg Hx  Fam Hx  Soc Hx        Reviewed and updated as needed this visit by Provider  Tobacco  Allergies  Meds  Problems  Med Hx  Surg Hx  Fam Hx  Soc Hx          ROS:  Constitutional, HEENT, cardiovascular, pulmonary, gi and gu systems are negative, except as otherwise noted.    OBJECTIVE:     /80 (BP Location: Right arm, Patient Position: Chair, Cuff Size: Adult Large)  Pulse 92  Temp 97.5  F (36.4  C) (Oral)  Ht 6' (1.829 m)  Wt 200 lb (90.7 kg)  SpO2 98%  BMI 27.12 kg/m2  Body mass index is 27.12 kg/(m^2).  GENERAL: healthy, alert and no distress  EYES: Eyes grossly normal to inspection, PERRL and conjunctivae and sclerae normal  NECK: no adenopathy, no asymmetry, masses, or scars and thyroid normal to palpation  RESP: lungs clear to auscultation - no rales, rhonchi or wheezes  CV: regular rate and rhythm, normal S1 S2, no S3 or S4, no murmur, click or rub, no peripheral edema and peripheral pulses strong  CV: presumed paced rhythm  ABDOMEN: soft, nontender, no hepatosplenomegaly, no masses and bowel sounds normal  MS: no gross musculoskeletal defects noted, no edema  NEURO: Normal strength and tone, mentation intact and speech normal  PSYCH:  mentation appears normal, affect normal/bright    Diagnostic Test Results:  Results for orders placed or performed in visit on 05/23/18 (from the past 24 hour(s))   INR   Result Value Ref Range    INR 2.80 (H) 0.86 - 1.14       ASSESSMENT/PLAN:             1. Chronic atrial fibrillation (H)  INR done today, stable, same dose, repeat one month.  Rate controlled and no other symptoms at this time.  No recent falls.  - INR    2. Essential hypertension  Under control at this time, no changes.    3. Chronic diastolic HF (heart failure) (H)  Stable and appears euvolemic.  No change in respiratory status.    4. Cervicalgia  Most likely DDD in cause.  Tylenol OK.  Talked about switching to a more supportive pillow as he sleeps on his back and will try that first.  No interested in other interventions at this time.      FUTURE APPOINTMENTS:       - Follow-up visit in 3-4 months, sooner prn.    Ad Brenner MD  [facility]

## 2018-05-23 NOTE — MR AVS SNAPSHOT
Bret LOY Merino   5/23/2018   Anticoagulation Therapy Visit    Description:  89 year old male   Provider:  Ad Brenner MD   Department:  Ne Nurse           INR as of 5/23/2018     Today's INR 2.80      Anticoagulation Summary as of 5/23/2018     INR goal 2.0-3.0    Today's INR 2.80    Full warfarin instructions 2.5 mg every day    Next INR check 6/25/2018      Contact Numbers     Please call [phone] to cancel and/or reschedule your appointment.  Please call [phone] with any problems or questions regarding your therapy          May 2018 Details    Sun Mon Tue Wed Thu Fri Sat       1               2               3               4               5                 6               7               8               9               10               11               12                 13               14               15               16               17               18               19                 20               21               22               23      2.5 mg   See details      24      2.5 mg         25      2.5 mg         26      2.5 mg           27      2.5 mg         28      2.5 mg         29      2.5 mg         30      2.5 mg         31      2.5 mg            Date Details   05/23 This INR check               How to take your warfarin dose     To take:  2.5 mg Take 1 of the 2.5 mg tablets.           June 2018 Details    Sun Mon Tue Wed Thu Fri Sat          1      2.5 mg         2      2.5 mg           3      2.5 mg         4      2.5 mg         5      2.5 mg         6      2.5 mg         7      2.5 mg         8      2.5 mg         9      2.5 mg           10      2.5 mg         11      2.5 mg         12      2.5 mg         13      2.5 mg         14      2.5 mg         15      2.5 mg         16      2.5 mg           17      2.5 mg         18      2.5 mg         19      2.5 mg         20      2.5 mg         21      2.5 mg         22      2.5 mg         23      2.5 mg           24      2.5  mg         25            26               27               28               29               30                Date Details   No additional details    Date of next INR:  6/25/2018         How to take your warfarin dose     To take:  2.5 mg Take 1 of the 2.5 mg tablets.

## 2018-05-23 NOTE — PATIENT INSTRUCTIONS
Today your INR was 2.8.    Stay on the same dose and recheck in about one month.    [facility]   Discharged by : payal  Paper scripts provided to patient : none     If you have any questions regarding your visit please contact your care team:     Team Gold                Clinic Hours Telephone Number     Dr. Alma Rosa Collazo, NP 7am-7pm  Monday - Thursday   7am-5pm  Fridays  [phone]   (Appointment scheduling available 24/7)     RN Line  [phone] option 2     [facility] [place] [facility] - 11am-9pm Monday-Friday Saturday-Sunday- 9am-5pm     [place] -   5pm-9pm Monday-Friday Saturday-Sunday- 9am-5pm    [phone] - [place]    [phone] - [place]       For a Price Quote for your services, please call our Consumer Price Line at [phone].     What options do I have for visits at the clinic other than the traditional office visit?     To expand how we care for you, many of our providers are utilizing electronic visits (e-visits) and telephone visits, when medically appropriate, for interactions with their patients rather than a visit in the clinic. We also offer nurse visits for many medical concerns. Just like any other service, we will bill your insurance company for this type of visit based on time spent on the phone with your provider. Not all insurance companies cover these visits. Please check with your medical insurance if this type of visit is covered. You will be responsible for any charges that are not paid by your insurance.   E-visits via Beaker: generally incur a $35.00 fee.     Telephone visits:  Time spent on the phone: *charged based on time that is spent on the phone in increments of 10 minutes. Estimated cost:   5-10 mins $30.00   11-20 mins. $59.00   21-30 mins. $85.00       Use Beaker (secure email communication and access to your chart) to  send your primary care provider a message or make an appointment. Ask someone on your Team how to sign up for CicerOOs.     As always, Thank you for trusting us with your health care needs!      [facility]:    Scheduling Appointments  [facility] [place]  Call: [phone]    [facility], [facility]  Call: [phone]    [facility]  Call: [phone]    For Gastroenterology referrals   [facility] Gastroenterology   Clinics and Surgery [place], 4th Floor   [address] [place] [address]   Appointments: [phone]    WHERE TO GO FOR CARE?  Clinic    Make an appointment if you:       Are sick (cold, cough, flu, sore throat, earache or in pain).       Have a small injury (sprain, small cut, burn or broken bone).       Need a physical exam, Pap smear, vaccine or prescription refill.       Have questions about your health or medicines.    To reach us:      Call [phone] ([phone]). Open 24 hours every day. (For counseling services, call [phone].)    Log into CicerOOs at [email or web address]. (Visit [email or web address] to create an account.) Hospital emergency room    An emergency is a serious or life- threatening problem that must be treated right away.    Call 235 or get to the hospital if you have:      Very bad or sudden:            - Chest pain or pressure         - Bleeding         - Head or belly pain         - Dizziness or trouble seeing, walking or                          Speaking      Problems breathing      Blood in your vomit or you are coughing up blood      A major injury (knocked out, loss of a finger or limb, rape, broken bone protruding from skin)    A mental health crisis. (Or call the Mental Health Crisis line at 1-866.475.7568 or Suicide Prevention Hotline at 1-449.599.6440.)    Open 24 hours every day. You don't need an appointment.     Urgent care    Visit urgent care for sickness or small injuries  when the clinic is closed. You don't need an appointment. To check hours or find an urgent care near you, visit [email or web address]. Online care    Get online care from [facility] for more than 70 common problems, like colds, allergies and infections. Open 24 hours every day at:   [email or web address]   Need help deciding?    For advice about where to be seen, you may call your clinic and ask to speak with a nurse. We're here for you 24 hours every day.         If you are deaf or hard of hearing, please let us know. We provide many free services including sign language interpreters, oral interpreters, TTYs, telephone amplifiers, note takers and written materials.

## 2018-08-09 NOTE — MR AVS SNAPSHOT
Bret GRANADO Angelique   8/9/2018   Anticoagulation Therapy Visit    Description:  89 year old male   Provider:  Ad Brenner MD   Department:  Ne Nurse           INR as of 8/9/2018     Today's INR 2.1 (8/8/2018)      Anticoagulation Summary as of 8/9/2018     INR goal 2.0-3.0    Today's INR 2.1 (8/8/2018)    Full warfarin instructions 2.5 mg every day    Next INR check 9/20/2018      Description     Call son if there's a dose change. Call [facility] and schedule next INR. Son would like it to be around the 20th of every month.      Your next Anticoagulation Clinic appointment(s)     Sep 20, 2018  8:45 AM CDT   [facility] with [facility]   [facility]    [address] [place] [id number] [phone]              Contact Numbers     Please call [phone] to cancel and/or reschedule your appointment.  Please call [phone] with any problems or questions regarding your therapy          August 2018 Details    Sun Mon Tue Wed Thu Fri Sat        1               2               3               4                 5               6               7               8               9      2.5 mg   See details      10      2.5 mg         11      2.5 mg           12      2.5 mg         13      2.5 mg         14      2.5 mg         15      2.5 mg         16      2.5 mg         17      2.5 mg         18      2.5 mg           19      2.5 mg         20      2.5 mg         21      2.5 mg         22      2.5 mg         23      2.5 mg         24      2.5 mg         25      2.5 mg           26      2.5 mg         27      2.5 mg         28      2.5 mg         29      2.5 mg         30      2.5 mg         31      2.5 mg           Date Details   08/09 This INR check               How to take your warfarin dose     To take:  2.5 mg Take 1 of the 2.5 mg tablets.           September 2018 Details    Sun Mon Tue Wed Thu Fri Sat           1      2.5 mg            2      2.5 mg         3      2.5 mg         4      2.5 mg         5      2.5 mg         6      2.5 mg         7      2.5 mg         8      2.5 mg           9      2.5 mg         10      2.5 mg         11      2.5 mg         12      2.5 mg         13      2.5 mg         14      2.5 mg         15      2.5 mg           16      2.5 mg         17      2.5 mg         18      2.5 mg         19      2.5 mg         20            21               22                 23               24               25               26               27               28               29                 30                      Date Details   No additional details    Date of next INR:  9/20/2018         How to take your warfarin dose     To take:  2.5 mg Take 1 of the 2.5 mg tablets.

## 2018-11-07 NOTE — TELEPHONE ENCOUNTER
Reason for Call: Request for an order or referral:    Order or referral being requested:  Patients son, Len,  is calling on behalf of patient.  Patient has a spot on his head that he is worried about. Patient would like a referral to see a dermatologist. Would like the referral to be within the [employer] system.      Date needed: as soon as possible    Has the patient been seen by the PCP for this problem? NO    Additional comments:     Phone number Patient can be reached at:  Other phone number:  [phone]/ barry Harrington    Best Time:  any    Can we leave a detailed message on this number?  YES    Call taken on 11/7/2018 at 9:53 AM by Adamaris Van

## 2018-11-07 NOTE — TELEPHONE ENCOUNTER
Referral placed for some [facility] locations in the [facility].  Please call son to let him know and provide numbers that he can use to call and schedule.    Ad Brenner MD

## 2018-11-07 NOTE — TELEPHONE ENCOUNTER
Route request for dermatology referral to PCP.  Last visit was 5/23/18. I do not see any previous referrals. Please see note below.  Are you willing to provide this, or would you like a visit?  Spoke with son Len, informed him that PCP could evaluate skin, may end up needing to refer to derm anyway though.  He states patient has a history of skin cancer, has a lesion on his head that is concerning and is hoping to get a referral to a dermatologist within [place] out near [place].      Amarilys Fuller RN

## 2018-11-07 NOTE — TELEPHONE ENCOUNTER
Called son Len and gave him the telephone numbers below.    Your provider has referred you to: [facility]: [facility] [phone]   [email or web address]  [facility]: [facility] [phone]    Kelby Barahona RN

## 2018-11-28 NOTE — LETTER
11/28/2018         RE: Bret Merino  [address]  [place] [address]        Dear Colleague,    Thank you for referring your patient, Bret Merino, to the [facility]. Please see a copy of my visit note below.    HPI:  Bret Merino is a 89 year old male patient here today for growth on scalp .  Patient states this has been present for a few months.  Patient reports the following symptoms: growing and irritation. Pt had a skin cancer around the same area in the past. Was removed at a different dermatology clinic and pt is unsure of the type of skin cancer .  Patient reports the following previous treatments: surgery.  Patient reports the following modifying factors: none.  Associated symptoms: none.  Patient has no other skin complaints today.  Remainder of the HPI, Meds, PMH, Allergies, FH, and SH was reviewed in chart.    Pertinent Hx:   Personal history of skin cancer. No family history of skin cancer.   Past Medical History:   Diagnosis Date     Pneumonia 01/2018     Skin cancer        Past Surgical History:   Procedure Laterality Date     ARTHROPLASTY KNEE BILATERAL       ARTHROPLASTY SHOULDER Right      AS TOTAL HIP ARTHROPLASTY Right      H CORONARY INTERVENTION      stentin for CAD     IMPLANT PACEMAKER       ORCHIECTOMY SCROTAL BILATERAL      treatment of prostate cancer        History reviewed. No pertinent family history.    Social History     Social History     Marital status:      Spouse name: N/A     Number of children: 5     Years of education: N/A     Occupational History     Not on file.     Social History Main Topics     Smoking status: Never Smoker     Smokeless tobacco: Never Used     Alcohol use No     Drug use: No     Sexual activity: Not Currently     Other Topics Concern     Not on file     Social History Narrative       Outpatient Encounter Prescriptions as of 11/28/2018   Medication Sig Dispense Refill     ASPIRIN NOT PRESCRIBED (INTENTIONAL)  Please choose reason not prescribed, below 0 each 0     atorvastatin (LIPITOR) 20 MG tablet Take 20 mg by mouth daily       lactobacillus rhamnosus, GG, (CULTURELL) capsule Take 1 capsule by mouth daily       lisinopril (PRINIVIL/ZESTRIL) 10 MG tablet Take 10 mg by mouth daily       metoprolol succinate (TOPROL-XL) 25 MG 24 hr tablet Take 25 mg by mouth daily       warfarin (JANTOVEN) 2.5 MG tablet Take 1 tablet (2.5 mg) by mouth daily As directed 90 tablet 3     No facility-administered encounter medications on file as of 11/28/2018.        Review Of Systems:  Skin: As above  Eyes: negative  Ears/Nose/Throat: negative  Respiratory: No shortness of breath, dyspnea on exertion, cough, or hemoptysis  Cardiovascular: negative  Gastrointestinal: negative  Genitourinary: negative  Musculoskeletal: negative  Neurologic: negative  Psychiatric: negative  Hematologic/Lymphatic/Immunologic: negative  Endocrine: negative      Objective:     /70  Pulse 80  SpO2 100%  Eyes: Conjunctivae/lids: Normal   ENT: Lips:  Normal  MSK: Normal  Cardiovascular: Peripheral edema none  Pulm: Breathing Normal  Neuro/Psych: Orientation: Normal; Mood/Affect: Normal, NAD, WDWN  Pt accompanied by: self  Following areas examined: face, neck, scalp, ears, hands. Pt defers full body exam  [place] skin type:ii   Findings:  Pink scaly patch on right frontal scalp 1.3cm  Pink scaly patch with area of yellow thickened crust on right posterior superior parietal scalp 4.0cm  Pink scaly macules on scalp x 8 on scalp  Rhytides, hypo/hyperpigmentation, and atrophy  Hypopigmented atrophy patch with pink scaly patch on parietal scalp.   Assessment and Plan:  1) actinic keratoses x 8 on scalp and solar elastosis  LN2 for 5 seconds x 2. Discussed AE include hypopigmentation (white spot) and recurrence. Follow up in 2-3 months to recheck lesions. There is a 0.025%-20% chance that AKs can develop into a SCC.   Discussed treating with LN2 vs PDT vs  Efudex. 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  No complications and routine wound care.  May grow back and will get a scar. Based on lesion type may need to completely remove lesion. Patient will be notified in 7-10 days of results. Wound care directions given.    3) Neoplasm of uncertain behavior on right frontal scalp 1.3cm  ISK vs HAK vs SCC  TANGENTIAL BIOPSY:  After consent, anesthesia with LEC and prep, tangential biopsy performed.  No complications and routine wound care.  May grow back and will get a scar. Based on lesion type may need to completely remove lesion. Patient will be notified in 7-10 days of results. Wound care directions given.    4) High blood pressure reading with history of hypertension    Patient to follow up with Primary Care provider regarding elevated blood pressure.    5) history of skin cancer on superior parietal scalp  Possible recurrence. Area biopsied.   Signs and Symptoms of non-melanoma skin cancer and ABCDEs of melanoma. Patient encouraged to perform monthly self skin exams and educated on how to perform them. UV precautions reviewed with patient. Patient was asked about new or changing moles/lesions on body.   Wear a sunscreen with at least SPF 30 on your face, ears, neck and V of the chest daily. Wear sunscreen on other areas of the body if those areas are exposed to the sun throughout the day. Sunscreens can contain physical and/or chemical blockers. Physical blockers are less likely to clog pores, these include zinc oxide and titanium dioxide. Reapply every two hour and after swimming. Sunscreen examples include Neutrogena, CeraVe, Blue Lizard, Elta MD and many others.      Follow up in 2-3 months to recheck AKs. Yearly FBE.       Again, thank you for allowing me to participate in the care of your patient.         Sincerely,        Adilene Jeffries PA-C

## 2018-11-28 NOTE — MR AVS SNAPSHOT
After Visit Summary   11/28/2018    Bret Merino    MRN: [id number]           Patient Information     Date Of Birth          6/17/1925        Visit Information        Provider Department      11/28/2018 1:20 PM Adilene Jeffries PA-C [facility]        Today's Diagnoses     AK (actinic keratosis)    -  1    Neoplasm of uncertain behavior of skin          Care Instructions    Proper skin care from [facility]:    -Eliminate harsh soaps as they strip the natural oils from the skin, often resulting in dry itchy skin ( i.e. Dial, Zest, [place])  -Use mild soaps such as Cetaphil or Dove Sensitive Skin in the shower. You do not need to use soap on arms, legs, and trunk every time you shower unless visibly soiled.   -Avoid hot or cold showers.  -After showering, lightly dry off and apply moisturizing within 2-3 minutes. This will help trap moisture in the skin.   -Aggressive use of a moisturizer at least 1-2 times a day to the entire body (including -Vanicream, Cetaphil, Aquaphor or Cerave) and moisturize hands after every washing.  -We recommend using moisturizers that come in a tub that needs to be scooped out, not a pump. This has more of an oil base. It will hold moisture in your skin much better than a water base moisturizer. The above recommended are non-pore clogging.           Wear a sunscreen with at least SPF 30 on your face, ears, neck and V of the chest daily. Wear sunscreen on other areas of the body if those areas are exposed to the sun throughout the day. Sunscreens can contain physical and/or chemical blockers. Physical blockers are less likely to clog pores, these include zinc oxide and titanium dioxide. Reapply every two hour and after swimming. Sunscreen examples include Neutrogena, CeraVe, Blue Lizard, Elta MD and many others.    UV radiation  UVA radiation remains constant throughout the day and throughout the year. It is a longer wavelength  than UVB and therefore penetrates deeper into the skin leading to immediate and delayed tanning, photoaging, and skin cancer. 70-80% of UVA and UVB radiation occurs between the hours of 10am-2pm.  UVB radiation  UVB radiation causes the most harmful effects and is more significant during the summer months. However, snow and ice can reflect UVB radiation leading to skin damage during the winter months as well. UVB radiation is responsible for tanning, burning, inflammation, delayed erythema (pinkness), pigmentation (brown spots), and skin cancer.   Just because you do not burn or are not developing a tan does not mean that you are not damaging your skin. A 15 minute drive to and from work for 30 years an lead to chronic sun damage of the skin. It is important to wear a broad spectrum (both UVA and UVB) sunscreen EVERY day with at least 30 SPF. Apply to face, ears, neck and v of the chest as this is where most of our sun exposure is. Reapply sunscreen every two hours if you plan on being outside.   Raúl Wallace. Clinical Dermatology: A Color Guide to Diagnosis and Therapy. Elsevier, 2016.                  Wound Care Instructions     FOR SUPERFICIAL WOUNDS     [facility] [phone]    [facility] [phone]          AFTER 24 HOURS YOU SHOULD REMOVE THE BANDAGE AND BEGIN DAILY DRESSING CHANGES AS FOLLOWS:     1) Remove Dressing.     2) Clean and dry the area with tap water using a Q-tip or sterile gauze pad.     3) Apply Vaseline, Aquaphor, Polysporin ointment or Bacitracin ointment over entire wound.  Do NOT use Neosporin ointment.     4) Cover the wound with a band-aid, or a sterile non-stick gauze pad and micropore paper tape      REPEAT THESE INSTRUCTIONS AT LEAST ONCE A DAY UNTIL THE WOUND HAS COMPLETELY HEALED.    It is an old wives tale that a wound heals better when it is exposed to air and allowed to dry out. The wound will heal faster with a better cosmetic result if it is  kept moist with ointment and covered with a bandage.    **Do not let the wound dry out.**      Supplies Needed:      *Cotton tipped applicators (Q-tips)    *Polysporin Ointment or Bacitracin Ointment (NOT NEOSPORIN)    *Band-aids or non-stick gauze pads and micropore paper tape.      PATIENT INFORMATION:    During the healing process you will notice a number of changes. All wounds develop a small halo of redness surrounding the wound.  This means healing is occurring. Severe itching with extensive redness usually indicates sensitivity to the ointment or bandage tape used to dress the wound.  You should call our office if this develops.      Swelling  and/or discoloration around your surgical site is common, particularly when performed around the eye.    All wounds normally drain.  The larger the wound the more drainage there will be.  After 7-10 days, you will notice the wound beginning to shrink and new skin will begin to grow.  The wound is healed when you can see skin has formed over the entire area.  A healed wound has a healthy, shiny look to the surface and is red to dark pink in color to normalize.  Wounds may take approximately 4-6 weeks to heal.  Larger wounds may take 6-8 weeks.  After the wound is healed you may discontinue dressing changes.    You may experience a sensation of tightness as your wound heals. This is normal and will gradually subside.    Your healed wound may be sensitive to temperature changes. This sensitivity improves with time, but if you re having a lot of discomfort, try to avoid temperature extremes.    Patients frequently experience itching after their wound appears to have healed because of the continue healing under the skin.  Plain Vaseline will help relieve the itching.        POSSIBLE COMPLICATIONS    BLEEDIN. Leave the bandage in place.  2. Use tightly rolled up gauze or a cloth to apply direct pressure over the bandage for 30  minutes.  3. Reapply pressure for an  additional 30 minutes if necessary  4. Use additional gauze and tape to maintain pressure once the bleeding has stopped.    WOUND CARE INSTRUCTIONS  FOR CRYOSURGERY        This area treated with liquid nitrogen will form a blister. You do not need to bandage the area until after the blister forms and breaks (which may be a few days).  When the blister breaks, begin daily dressing changes as follows:    1) Clean and dry the area with tap water using clean Q-tip or sterile gauze pad.    2) Apply Polysporin ointment or Bacitracin ointment over entire wound.  Do NOT use Neosporin ointment.    3) Cover the wound with a band-aid or sterile non-stick gauze pad and micropore paper tape.      REPEAT THESE INSTRUCTIONS AT LEAST ONCE A DAY UNTIL THE WOUND HAS COMPLETELY HEALED.        It is an old wives tale that a wound heals better when it is exposed to air and allowed to dry out. The wound will heal faster with a better cosmetic result if it is kept moist with ointment and covered with a bandage.  Do not let the wound dry out.      IMPORTANT INFORMATION ON REVERSE SIDE    Supplies Needed:     *Cotton tipped applicators (Q-tips)   *Polysporin ointment or Bacitracin ointment (NOT NEOSPORIN)   *Band-aids, or non stick gauze pads and micropore paper tape                PATIENT INFORMATION    During the healing process you will notice a number of changes. All wounds develop a small halo of redness surrounding the wound.  This means healing is occurring. Severe itching with extensive redness usually indicates sensitivity to the ointment or bandage tape used to dress the wound.  You should call our office if this develops.      Swelling and/or discoloration around your surgical site is common, particularly when performed around the eye.    All wounds normally drain.  The larger the wound the more drainage there will be.  After 7-10 days, you will notice the wound beginning to shrink and new skin will begin to grow.  The wound is  "healed when you can see skin has formed over the entire area.  A healed wound has a healthy, shiny look to the surface and is red to dark pink in color to normalize.  Wounds may take approximately 4-6 weeks to heal.  Larger wounds may take 6-8 weeks.  After the wound is healed you may discontinue dressing changes.    You may experience a sensation of tightness as your wound heals. This is normal and will gradually subside.    Your healed wound may be sensitive to temperature changes. This sensitivity improves with time, but if you re having a lot of discomfort, try to avoid temperature extremes.    Patients frequently experience itching after their wound appears to have healed because of the continue healing under the skin.  Plain Vaseline will help relieve the itching.                   Follow-ups after your visit        Who to contact     If you have questions or need follow up information about today's clinic visit or your schedule please contact [facility] directly at [phone].  Normal or non-critical lab and imaging results will be communicated to you by [employer], letter or phone within 4 business days after the clinic has received the results. If you do not hear from us within 7 days, please contact the clinic through [employer] or phone. If you have a critical or abnormal lab result, we will notify you by phone as soon as possible.  Submit refill requests through [employer] or call your pharmacy and they will forward the refill request to us. Please allow 3 business days for your refill to be completed.          Additional Information About Your Visit        [employer] Information     [employer] lets you send messages to your doctor, view your test results, renew your prescriptions, schedule appointments and more. To sign up, go to [email or web address] . Click on \"Log in\" on the left side of the screen, which will take you to the Welcome page. Then click on \"Sign up Now\" on the right side " of the page.     You will be asked to enter the access code listed below, as well as some personal information. Please follow the directions to create your username and password.     Your access code is: [email or web address]  Expires: 2019  1:45 PM     Your access code will  in 90 days. If you need help or a new code, please call your [place] clinic or [phone].        Care EveryWhere ID     This is your Care EveryWhere ID. This could be used by other organizations to access your [place] medical records  [id number]        Your Vitals Were     Pulse Pulse Oximetry                80 100%           Blood Pressure from Last 3 Encounters:   18 158/70   18 130/80   18 138/70    Weight from Last 3 Encounters:   18 90.7 kg (200 lb)   18 87.6 kg (193 lb 3.2 oz)   18 88.7 kg (195 lb 9.6 oz)              We Performed the Following     BIOPSY SKIN/SUBQ/MUC MEM, EACH ADDTL LESION     BIOPSY SKIN/SUBQ/MUC MEM, SINGLE LESION     Dermatological path order and indications     DESTRUCT PREMALIGNANT LESION, 2-14     DESTRUCT PREMALIGNANT LESION, FIRST        Primary Care Provider Office Phone # Fax #    Ad Brenner [phone]       [address] [address]        Equal Access to Services     [facility]IN [facility] Hospital[facility]IL[facility] : Hadii aad ku hadasho Soluzali, waaxda luqadaha, qaybta kaalmada adeshielayada, amber beebe . So [facility] [phone].    ATENCIÓN: Si habla español, tiene a medina disposición servicios gratuitos de asistencia lingüística. Ree al [phone].    We comply with applicable federal civil rights laws and [place] laws. We do not discriminate on the basis of race, color, national origin, age, disability, sex, sexual orientation, or gender identity.            Thank you!     Thank you for choosing [facility]  for your care. Our goal is always to provide you with excellent care. Hearing back  from our patients is one way we can continue to improve our services. Please take a few minutes to complete the written survey that you may receive in the mail after your visit with us. Thank you!             Your Updated Medication List - Protect others around you: Learn how to safely use, store and throw away your medicines at www.disposemymeds.org.          This list is accurate as of 11/28/18  1:45 PM.  Always use your most recent med list.                   Brand Name Dispense Instructions for use Diagnosis    ASPIRIN NOT PRESCRIBED    INTENTIONAL    0 each    Please choose reason not prescribed, below    Chronic atrial fibrillation (H)       lactobacillus rhamnosus (GG) capsule      Take 1 capsule by mouth daily        LIPITOR 20 MG tablet   Generic drug:  atorvastatin      Take 20 mg by mouth daily        lisinopril 10 MG tablet    PRINIVIL/ZESTRIL     Take 10 mg by mouth daily        metoprolol succinate 25 MG 24 hr tablet    TOPROL-XL     Take 25 mg by mouth daily        warfarin 2.5 MG tablet    JANTOVEN    90 tablet    Take 1 tablet (2.5 mg) by mouth daily As directed    Chronic atrial fibrillation (H)

## 2018-11-28 NOTE — PATIENT INSTRUCTIONS
Proper skin care from [facility]:    -Eliminate harsh soaps as they strip the natural oils from the skin, often resulting in dry itchy skin ( i.e. Dial, Zest, Genoveva Spring)  -Use mild soaps such as Cetaphil or Dove Sensitive Skin in the shower. You do not need to use soap on arms, legs, and trunk every time you shower unless visibly soiled.   -Avoid hot or cold showers.  -After showering, lightly dry off and apply moisturizing within 2-3 minutes. This will help trap moisture in the skin.   -Aggressive use of a moisturizer at least 1-2 times a day to the entire body (including -Vanicream, Cetaphil, Aquaphor or Cerave) and moisturize hands after every washing.  -We recommend using moisturizers that come in a tub that needs to be scooped out, not a pump. This has more of an oil base. It will hold moisture in your skin much better than a water base moisturizer. The above recommended are non-pore clogging.           Wear a sunscreen with at least SPF 30 on your face, ears, neck and V of the chest daily. Wear sunscreen on other areas of the body if those areas are exposed to the sun throughout the day. Sunscreens can contain physical and/or chemical blockers. Physical blockers are less likely to clog pores, these include zinc oxide and titanium dioxide. Reapply every two hour and after swimming. Sunscreen examples include Neutrogena, CeraVe, Blue Lizard, Elta MD and many others.    UV radiation  UVA radiation remains constant throughout the day and throughout the year. It is a longer wavelength than UVB and therefore penetrates deeper into the skin leading to immediate and delayed tanning, photoaging, and skin cancer. 70-80% of UVA and UVB radiation occurs between the hours of 10am-2pm.  UVB radiation  UVB radiation causes the most harmful effects and is more significant during the summer months. However, snow and ice can reflect UVB radiation leading to skin damage during the winter months as well. UVB  radiation is responsible for tanning, burning, inflammation, delayed erythema (pinkness), pigmentation (brown spots), and skin cancer.   Just because you do not burn or are not developing a tan does not mean that you are not damaging your skin. A 15 minute drive to and from work for 30 years an lead to chronic sun damage of the skin. It is important to wear a broad spectrum (both UVA and UVB) sunscreen EVERY day with at least 30 SPF. Apply to face, ears, neck and v of the chest as this is where most of our sun exposure is. Reapply sunscreen every two hours if you plan on being outside.   Raúl Wallace. Clinical Dermatology: A Color Guide to Diagnosis and Therapy. Elsevier, 2016.                  Wound Care Instructions     FOR SUPERFICIAL WOUNDS     [facility] [phone]    [facility] [phone]          AFTER 24 HOURS YOU SHOULD REMOVE THE BANDAGE AND BEGIN DAILY DRESSING CHANGES AS FOLLOWS:     1) Remove Dressing.     2) Clean and dry the area with tap water using a Q-tip or sterile gauze pad.     3) Apply Vaseline, Aquaphor, Polysporin ointment or Bacitracin ointment over entire wound.  Do NOT use Neosporin ointment.     4) Cover the wound with a band-aid, or a sterile non-stick gauze pad and micropore paper tape      REPEAT THESE INSTRUCTIONS AT LEAST ONCE A DAY UNTIL THE WOUND HAS COMPLETELY HEALED.    It is an old wives tale that a wound heals better when it is exposed to air and allowed to dry out. The wound will heal faster with a better cosmetic result if it is kept moist with ointment and covered with a bandage.    **Do not let the wound dry out.**      Supplies Needed:      *Cotton tipped applicators (Q-tips)    *Polysporin Ointment or Bacitracin Ointment (NOT NEOSPORIN)    *Band-aids or non-stick gauze pads and micropore paper tape.      PATIENT INFORMATION:    During the healing process you will notice a number of changes. All wounds develop a small halo of redness  surrounding the wound.  This means healing is occurring. Severe itching with extensive redness usually indicates sensitivity to the ointment or bandage tape used to dress the wound.  You should call our office if this develops.      Swelling  and/or discoloration around your surgical site is common, particularly when performed around the eye.    All wounds normally drain.  The larger the wound the more drainage there will be.  After 7-10 days, you will notice the wound beginning to shrink and new skin will begin to grow.  The wound is healed when you can see skin has formed over the entire area.  A healed wound has a healthy, shiny look to the surface and is red to dark pink in color to normalize.  Wounds may take approximately 4-6 weeks to heal.  Larger wounds may take 6-8 weeks.  After the wound is healed you may discontinue dressing changes.    You may experience a sensation of tightness as your wound heals. This is normal and will gradually subside.    Your healed wound may be sensitive to temperature changes. This sensitivity improves with time, but if you re having a lot of discomfort, try to avoid temperature extremes.    Patients frequently experience itching after their wound appears to have healed because of the continue healing under the skin.  Plain Vaseline will help relieve the itching.        POSSIBLE COMPLICATIONS    BLEEDIN. Leave the bandage in place.  2. Use tightly rolled up gauze or a cloth to apply direct pressure over the bandage for 30  minutes.  3. Reapply pressure for an additional 30 minutes if necessary  4. Use additional gauze and tape to maintain pressure once the bleeding has stopped.    WOUND CARE INSTRUCTIONS  FOR CRYOSURGERY        This area treated with liquid nitrogen will form a blister. You do not need to bandage the area until after the blister forms and breaks (which may be a few days).  When the blister breaks, begin daily dressing changes as follows:    1) Clean and  dry the area with tap water using clean Q-tip or sterile gauze pad.    2) Apply Polysporin ointment or Bacitracin ointment over entire wound.  Do NOT use Neosporin ointment.    3) Cover the wound with a band-aid or sterile non-stick gauze pad and micropore paper tape.      REPEAT THESE INSTRUCTIONS AT LEAST ONCE A DAY UNTIL THE WOUND HAS COMPLETELY HEALED.        It is an old wives tale that a wound heals better when it is exposed to air and allowed to dry out. The wound will heal faster with a better cosmetic result if it is kept moist with ointment and covered with a bandage.  Do not let the wound dry out.      IMPORTANT INFORMATION ON REVERSE SIDE    Supplies Needed:     *Cotton tipped applicators (Q-tips)   *Polysporin ointment or Bacitracin ointment (NOT NEOSPORIN)   *Band-aids, or non stick gauze pads and micropore paper tape                PATIENT INFORMATION    During the healing process you will notice a number of changes. All wounds develop a small halo of redness surrounding the wound.  This means healing is occurring. Severe itching with extensive redness usually indicates sensitivity to the ointment or bandage tape used to dress the wound.  You should call our office if this develops.      Swelling and/or discoloration around your surgical site is common, particularly when performed around the eye.    All wounds normally drain.  The larger the wound the more drainage there will be.  After 7-10 days, you will notice the wound beginning to shrink and new skin will begin to grow.  The wound is healed when you can see skin has formed over the entire area.  A healed wound has a healthy, shiny look to the surface and is red to dark pink in color to normalize.  Wounds may take approximately 4-6 weeks to heal.  Larger wounds may take 6-8 weeks.  After the wound is healed you may discontinue dressing changes.    You may experience a sensation of tightness as your wound heals. This is normal and will gradually  subside.    Your healed wound may be sensitive to temperature changes. This sensitivity improves with time, but if you re having a lot of discomfort, try to avoid temperature extremes.    Patients frequently experience itching after their wound appears to have healed because of the continue healing under the skin.  Plain Vaseline will help relieve the itching.

## 2018-12-04 NOTE — TELEPHONE ENCOUNTER
Called and spoke to patients son- consent to communicate on file. Educated patients son on biopsy results- SCC in situ x2. Educated patients son on SCC, Mohs, scheduled Mohs x2, and letter/packet sent. Patients son voiced understanding.    Mirna RN-BSN  [facility]  [phone]

## 2018-12-04 NOTE — TELEPHONE ENCOUNTER
Notes Recorded by Adilene Jeffries PA-C on 12/4/2018 at 10:18 AM  A. 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 Skin, right frontal scalp:   Squamous cell carcinoma, at least in situ -    --mohs

## 2018-12-04 NOTE — LETTER
[facility]  [address]  [address]  [phone]    12/4/2018       Bret Merino  [address] [place] [id number]      Dear Bret:    You are scheduled for Mohs Surgery on: 1/17/19@ 7:45am & 1/24/19 @7:45am.    Please check in at [facility], [address].     You don't need to arrive more than 5-10 minutes prior to your appointment time.     Be sure to eat a good breakfast and bathe and wash your hair prior to surgery.     If you are taking any anti-coagulants that are prescribed by your Doctor (such as Coumadin/Warfarin, Plavix, Aspirin, Ibuprofen), please continue taking them.     However, if you are taking anti-coagulants over the counter without a Doctor's order for a medical condition, please discontinue them 10 days prior to surgery.     Please wear loose comfortable clothing as it could possibly be 4-6 hours until your surgery is completed depending upon how many layers of tissue need to be removed.      Thank you,    BENJAMIN Gann MD

## 2018-12-08 NOTE — TELEPHONE ENCOUNTER
Reason for Call:  Other appointment    Detailed comments: Patient wants to reschedule his MOH's surgery. States he is not able to come in that early. Would like to reschedule for sometime around the 10am time. Please give patient a call to reschedule. Thank you.    Phone Number Patient can be reached at: Home number on file [phone] (home)    Best Time: Anytime    Can we leave a detailed message on this number? YES    Call taken on 12/8/2018 at 1:41 PM by Ronn Cabrera

## 2018-12-10 NOTE — TELEPHONE ENCOUNTER
Called and discussed time with patients son- he states that these times are fine - he is not sure where the confusion came from but they are fine with this time.    Archana George RN BSN  [facility]  [phone]

## 2019-01-01 ENCOUNTER — TRANSFERRED RECORDS (OUTPATIENT)
Dept: HEALTH INFORMATION MANAGEMENT | Facility: CLINIC | Age: 84
End: 2019-01-01

## 2019-01-01 ENCOUNTER — TELEPHONE (OUTPATIENT)
Dept: GERIATRICS | Facility: CLINIC | Age: 84
End: 2019-01-01

## 2019-01-01 ENCOUNTER — NURSING HOME VISIT (OUTPATIENT)
Dept: GERIATRICS | Facility: CLINIC | Age: 84
End: 2019-01-01
Payer: COMMERCIAL

## 2019-01-01 ENCOUNTER — APPOINTMENT (OUTPATIENT)
Dept: PHYSICAL THERAPY | Facility: CLINIC | Age: 84
DRG: 689 | End: 2019-01-01
Payer: COMMERCIAL

## 2019-01-01 ENCOUNTER — APPOINTMENT (OUTPATIENT)
Dept: CT IMAGING | Facility: CLINIC | Age: 84
DRG: 689 | End: 2019-01-01
Attending: PHYSICIAN ASSISTANT
Payer: COMMERCIAL

## 2019-01-01 ENCOUNTER — TELEPHONE (OUTPATIENT)
Dept: FAMILY MEDICINE | Facility: CLINIC | Age: 84
End: 2019-01-01

## 2019-01-01 ENCOUNTER — APPOINTMENT (OUTPATIENT)
Dept: ULTRASOUND IMAGING | Facility: CLINIC | Age: 84
End: 2019-01-01
Attending: EMERGENCY MEDICINE
Payer: COMMERCIAL

## 2019-01-01 ENCOUNTER — HOSPITAL ENCOUNTER (INPATIENT)
Facility: CLINIC | Age: 84
LOS: 4 days | Discharge: NURSING, REHAB OR LONG-TERM CARE | DRG: 689 | End: 2019-02-26
Attending: EMERGENCY MEDICINE | Admitting: HOSPITALIST
Payer: COMMERCIAL

## 2019-01-01 ENCOUNTER — APPOINTMENT (OUTPATIENT)
Dept: GENERAL RADIOLOGY | Facility: CLINIC | Age: 84
End: 2019-01-01
Attending: EMERGENCY MEDICINE
Payer: COMMERCIAL

## 2019-01-01 ENCOUNTER — DOCUMENTATION ONLY (OUTPATIENT)
Dept: OTHER | Facility: CLINIC | Age: 84
End: 2019-01-01

## 2019-01-01 ENCOUNTER — HOSPITAL ENCOUNTER (EMERGENCY)
Facility: CLINIC | Age: 84
Discharge: HOME | End: 2019-02-14
Attending: EMERGENCY MEDICINE | Admitting: EMERGENCY MEDICINE
Payer: COMMERCIAL

## 2019-01-01 ENCOUNTER — APPOINTMENT (OUTPATIENT)
Dept: CARDIOLOGY | Facility: CLINIC | Age: 84
DRG: 689 | End: 2019-01-01
Attending: HOSPITALIST
Payer: COMMERCIAL

## 2019-01-01 ENCOUNTER — APPOINTMENT (OUTPATIENT)
Dept: GENERAL RADIOLOGY | Facility: CLINIC | Age: 84
DRG: 689 | End: 2019-01-01
Attending: HOSPITALIST
Payer: COMMERCIAL

## 2019-01-01 ENCOUNTER — APPOINTMENT (OUTPATIENT)
Dept: PHYSICAL THERAPY | Facility: CLINIC | Age: 84
DRG: 689 | End: 2019-01-01
Attending: INTERNAL MEDICINE
Payer: COMMERCIAL

## 2019-01-01 ENCOUNTER — RESULTS ONLY (OUTPATIENT)
Dept: ADMINISTRATIVE | Facility: CLINIC | Age: 84
End: 2019-01-01

## 2019-01-01 VITALS
WEIGHT: 194.1 LBS | RESPIRATION RATE: 18 BRPM | TEMPERATURE: 97 F | DIASTOLIC BLOOD PRESSURE: 71 MMHG | BODY MASS INDEX: 26.32 KG/M2 | OXYGEN SATURATION: 100 % | HEART RATE: 74 BPM | SYSTOLIC BLOOD PRESSURE: 149 MMHG

## 2019-01-01 VITALS
RESPIRATION RATE: 18 BRPM | TEMPERATURE: 97.4 F | HEART RATE: 71 BPM | OXYGEN SATURATION: 96 % | BODY MASS INDEX: 25.57 KG/M2 | SYSTOLIC BLOOD PRESSURE: 99 MMHG | WEIGHT: 188.5 LBS | DIASTOLIC BLOOD PRESSURE: 56 MMHG

## 2019-01-01 VITALS
SYSTOLIC BLOOD PRESSURE: 94 MMHG | WEIGHT: 191.6 LBS | BODY MASS INDEX: 25.99 KG/M2 | RESPIRATION RATE: 18 BRPM | HEART RATE: 69 BPM | TEMPERATURE: 99.5 F | OXYGEN SATURATION: 95 % | DIASTOLIC BLOOD PRESSURE: 43 MMHG

## 2019-01-01 VITALS
BODY MASS INDEX: 26.28 KG/M2 | TEMPERATURE: 97.8 F | SYSTOLIC BLOOD PRESSURE: 116 MMHG | HEART RATE: 70 BPM | OXYGEN SATURATION: 98 % | RESPIRATION RATE: 18 BRPM | DIASTOLIC BLOOD PRESSURE: 64 MMHG | WEIGHT: 193.8 LBS

## 2019-01-01 VITALS
HEART RATE: 64 BPM | OXYGEN SATURATION: 97 % | SYSTOLIC BLOOD PRESSURE: 112 MMHG | DIASTOLIC BLOOD PRESSURE: 56 MMHG | TEMPERATURE: 98.2 F | RESPIRATION RATE: 18 BRPM | BODY MASS INDEX: 26.19 KG/M2 | WEIGHT: 193.1 LBS

## 2019-01-01 VITALS
TEMPERATURE: 98.2 F | WEIGHT: 193.9 LBS | OXYGEN SATURATION: 99 % | DIASTOLIC BLOOD PRESSURE: 48 MMHG | RESPIRATION RATE: 20 BRPM | BODY MASS INDEX: 26.3 KG/M2 | SYSTOLIC BLOOD PRESSURE: 108 MMHG | HEART RATE: 63 BPM

## 2019-01-01 VITALS
OXYGEN SATURATION: 97 % | SYSTOLIC BLOOD PRESSURE: 120 MMHG | TEMPERATURE: 97 F | WEIGHT: 194 LBS | HEART RATE: 70 BPM | BODY MASS INDEX: 26.31 KG/M2 | RESPIRATION RATE: 18 BRPM | DIASTOLIC BLOOD PRESSURE: 66 MMHG

## 2019-01-01 VITALS
OXYGEN SATURATION: 95 % | DIASTOLIC BLOOD PRESSURE: 60 MMHG | SYSTOLIC BLOOD PRESSURE: 118 MMHG | HEART RATE: 80 BPM | TEMPERATURE: 99.3 F | RESPIRATION RATE: 18 BRPM

## 2019-01-01 VITALS
HEIGHT: 72 IN | DIASTOLIC BLOOD PRESSURE: 74 MMHG | TEMPERATURE: 97.6 F | WEIGHT: 200 LBS | SYSTOLIC BLOOD PRESSURE: 168 MMHG | HEART RATE: 63 BPM | OXYGEN SATURATION: 97 % | RESPIRATION RATE: 20 BRPM | BODY MASS INDEX: 27.09 KG/M2

## 2019-01-01 VITALS
BODY MASS INDEX: 26.31 KG/M2 | OXYGEN SATURATION: 96 % | HEART RATE: 76 BPM | RESPIRATION RATE: 16 BRPM | SYSTOLIC BLOOD PRESSURE: 120 MMHG | WEIGHT: 194 LBS | DIASTOLIC BLOOD PRESSURE: 64 MMHG | TEMPERATURE: 96 F

## 2019-01-01 VITALS
SYSTOLIC BLOOD PRESSURE: 114 MMHG | WEIGHT: 192.3 LBS | RESPIRATION RATE: 18 BRPM | BODY MASS INDEX: 26.08 KG/M2 | DIASTOLIC BLOOD PRESSURE: 68 MMHG | OXYGEN SATURATION: 97 % | TEMPERATURE: 97.1 F | HEART RATE: 72 BPM

## 2019-01-01 VITALS
BODY MASS INDEX: 22.72 KG/M2 | HEART RATE: 60 BPM | RESPIRATION RATE: 17 BRPM | TEMPERATURE: 98.4 F | SYSTOLIC BLOOD PRESSURE: 122 MMHG | WEIGHT: 167.5 LBS | DIASTOLIC BLOOD PRESSURE: 54 MMHG | OXYGEN SATURATION: 98 %

## 2019-01-01 VITALS
HEIGHT: 72 IN | RESPIRATION RATE: 16 BRPM | SYSTOLIC BLOOD PRESSURE: 137 MMHG | OXYGEN SATURATION: 98 % | TEMPERATURE: 98.9 F | WEIGHT: 207.01 LBS | BODY MASS INDEX: 28.04 KG/M2 | HEART RATE: 62 BPM | DIASTOLIC BLOOD PRESSURE: 50 MMHG

## 2019-01-01 DIAGNOSIS — Z85.46 HX OF MALIGNANT NEOPLASM OF PROSTATE: ICD-10-CM

## 2019-01-01 DIAGNOSIS — C61 PROSTATE CANCER (H): ICD-10-CM

## 2019-01-01 DIAGNOSIS — R31.9 URINARY TRACT INFECTION WITH HEMATURIA, SITE UNSPECIFIED: Primary | ICD-10-CM

## 2019-01-01 DIAGNOSIS — Z85.828 HISTORY OF SKIN CANCER: ICD-10-CM

## 2019-01-01 DIAGNOSIS — N39.0 URINARY TRACT INFECTION WITHOUT HEMATURIA, SITE UNSPECIFIED: ICD-10-CM

## 2019-01-01 DIAGNOSIS — R33.9 URINARY RETENTION: ICD-10-CM

## 2019-01-01 DIAGNOSIS — E11.8 TYPE 2 DIABETES MELLITUS WITH COMPLICATION, WITHOUT LONG-TERM CURRENT USE OF INSULIN (H): ICD-10-CM

## 2019-01-01 DIAGNOSIS — I10 ESSENTIAL HYPERTENSION: ICD-10-CM

## 2019-01-01 DIAGNOSIS — I25.5 ISCHEMIC CARDIOMYOPATHY: ICD-10-CM

## 2019-01-01 DIAGNOSIS — G93.40 ENCEPHALOPATHY: ICD-10-CM

## 2019-01-01 DIAGNOSIS — J18.9 PNEUMONIA OF RIGHT LOWER LOBE DUE TO INFECTIOUS ORGANISM: ICD-10-CM

## 2019-01-01 DIAGNOSIS — N39.0 ENTEROCOCCUS UTI: ICD-10-CM

## 2019-01-01 DIAGNOSIS — R29.6 RECURRENT FALLS: ICD-10-CM

## 2019-01-01 DIAGNOSIS — I48.20 CHRONIC ATRIAL FIBRILLATION (H): ICD-10-CM

## 2019-01-01 DIAGNOSIS — M62.81 GENERALIZED MUSCLE WEAKNESS: ICD-10-CM

## 2019-01-01 DIAGNOSIS — I48.20 CHRONIC ATRIAL FIBRILLATION (H): Primary | ICD-10-CM

## 2019-01-01 DIAGNOSIS — N39.0 URINARY TRACT INFECTION WITH HEMATURIA, SITE UNSPECIFIED: Primary | ICD-10-CM

## 2019-01-01 DIAGNOSIS — R60.0 BILATERAL LEG EDEMA: ICD-10-CM

## 2019-01-01 DIAGNOSIS — R31.9 HEMATURIA, UNSPECIFIED TYPE: ICD-10-CM

## 2019-01-01 DIAGNOSIS — R33.9 URINE RETENTION: ICD-10-CM

## 2019-01-01 DIAGNOSIS — N30.01 ACUTE CYSTITIS WITH HEMATURIA: Primary | ICD-10-CM

## 2019-01-01 DIAGNOSIS — E78.5 HYPERLIPIDEMIA, UNSPECIFIED HYPERLIPIDEMIA TYPE: ICD-10-CM

## 2019-01-01 DIAGNOSIS — R29.6 FALLS FREQUENTLY: ICD-10-CM

## 2019-01-01 DIAGNOSIS — Z71.89 COUNSELING REGARDING ADVANCED DIRECTIVES: Primary | ICD-10-CM

## 2019-01-01 DIAGNOSIS — R29.6 RECURRENT FALLS: Primary | ICD-10-CM

## 2019-01-01 DIAGNOSIS — I25.10 CORONARY ARTERY DISEASE INVOLVING NATIVE HEART WITHOUT ANGINA PECTORIS, UNSPECIFIED VESSEL OR LESION TYPE: ICD-10-CM

## 2019-01-01 DIAGNOSIS — R53.1 WEAKNESS: ICD-10-CM

## 2019-01-01 DIAGNOSIS — D64.9 ANEMIA, UNSPECIFIED TYPE: ICD-10-CM

## 2019-01-01 DIAGNOSIS — M54.2 CERVICALGIA: ICD-10-CM

## 2019-01-01 DIAGNOSIS — B95.2 ENTEROCOCCUS UTI: ICD-10-CM

## 2019-01-01 DIAGNOSIS — Z95.810 ICD (IMPLANTABLE CARDIOVERTER-DEFIBRILLATOR), BIVENTRICULAR, IN SITU: ICD-10-CM

## 2019-01-01 DIAGNOSIS — E11.9 TYPE 2 DIABETES MELLITUS WITHOUT COMPLICATION, WITHOUT LONG-TERM CURRENT USE OF INSULIN (H): ICD-10-CM

## 2019-01-01 DIAGNOSIS — Z79.01 LONG TERM CURRENT USE OF ANTICOAGULANT THERAPY: ICD-10-CM

## 2019-01-01 DIAGNOSIS — I42.9 CARDIOMYOPATHY, UNSPECIFIED TYPE (H): ICD-10-CM

## 2019-01-01 DIAGNOSIS — Z97.8 FOLEY CATHETER IN PLACE ON ADMISSION: ICD-10-CM

## 2019-01-01 DIAGNOSIS — C61 PROSTATE CANCER (H): Primary | ICD-10-CM

## 2019-01-01 DIAGNOSIS — I25.10 CORONARY ARTERY DISEASE INVOLVING NATIVE CORONARY ARTERY OF NATIVE HEART WITHOUT ANGINA PECTORIS: ICD-10-CM

## 2019-01-01 DIAGNOSIS — G47.00 INSOMNIA, UNSPECIFIED TYPE: ICD-10-CM

## 2019-01-01 DIAGNOSIS — R91.8 PULMONARY INFILTRATE: Primary | ICD-10-CM
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
## 2019-01-01 DIAGNOSIS — I50.32 CHRONIC DIASTOLIC HF (HEART FAILURE) (H): ICD-10-CM

## 2019-01-01 DIAGNOSIS — M89.9 BONE LESION: Primary | ICD-10-CM

## 2019-01-01 DIAGNOSIS — N17.9 ACUTE KIDNEY INJURY (H): ICD-10-CM

## 2019-01-01 DIAGNOSIS — Z71.89 GOALS OF CARE, COUNSELING/DISCUSSION: ICD-10-CM

## 2019-01-01 DIAGNOSIS — R79.1 ELEVATED INR: Primary | ICD-10-CM

## 2019-01-01 DIAGNOSIS — R53.81 PHYSICAL DECONDITIONING: ICD-10-CM

## 2019-01-01 DIAGNOSIS — R60.0 LOCALIZED EDEMA: ICD-10-CM

## 2019-01-01 DIAGNOSIS — R93.89 ABNORMAL CT SCAN: ICD-10-CM

## 2019-01-01 DIAGNOSIS — N17.9 AKI (ACUTE KIDNEY INJURY) (H): ICD-10-CM

## 2019-01-01 LAB
ABO + RH BLD: NORMAL
ABO + RH BLD: NORMAL
ALBUMIN SERPL-MCNC: 2.6 G/DL (ref 3.4–5)
ALBUMIN SERPL-MCNC: 3 G/DL (ref 3.4–5)
ALBUMIN SERPL-MCNC: 3.1 G/DL (ref 3.4–5)
ALBUMIN UR-MCNC: 10 MG/DL
ALBUMIN UR-MCNC: NEGATIVE MG/DL
ALP SERPL-CCNC: 150 U/L (ref 40–150)
ALP SERPL-CCNC: 157 U/L (ref 40–150)
ALP SERPL-CCNC: 225 U/L (ref 40–150)
ALT SERPL W P-5'-P-CCNC: 24 U/L (ref 0–70)
ALT SERPL W P-5'-P-CCNC: 26 U/L (ref 0–70)
ALT SERPL W P-5'-P-CCNC: 32 U/L (ref 0–70)
ANION GAP SERPL CALCULATED.3IONS-SCNC: 5 MMOL/L (ref 3–14)
ANION GAP SERPL CALCULATED.3IONS-SCNC: 6 MMOL/L (ref 0–15)
ANION GAP SERPL CALCULATED.3IONS-SCNC: 6 MMOL/L (ref 3–14)
ANION GAP SERPL CALCULATED.3IONS-SCNC: 6 MMOL/L (ref 3–14)
ANION GAP SERPL CALCULATED.3IONS-SCNC: 7 MMOL/L (ref 3–14)
ANION GAP SERPL CALCULATED.3IONS-SCNC: 8 MMOL/L (ref 0–15)
ANION GAP SERPL CALCULATED.3IONS-SCNC: 8 MMOL/L (ref 3–14)
ANION GAP SERPL CALCULATED.3IONS-SCNC: 8 MMOL/L (ref 3–14)
ANION GAP SERPL CALCULATED.3IONS-SCNC: 9 MMOL/L (ref 0–15)
APPEARANCE UR: ABNORMAL
APPEARANCE UR: CLEAR
AST SERPL W P-5'-P-CCNC: 23 U/L (ref 0–45)
AST SERPL W P-5'-P-CCNC: 26 U/L (ref 0–45)
AST SERPL W P-5'-P-CCNC: 27 U/L (ref 0–45)
BACTERIA #/AREA URNS HPF: ABNORMAL /HPF
BACTERIA SPEC CULT: ABNORMAL
BASOPHILS # BLD AUTO: 0 10E9/L (ref 0–0.2)
BASOPHILS NFR BLD AUTO: 0.1 %
BASOPHILS NFR BLD AUTO: 0.1 %
BASOPHILS NFR BLD AUTO: 0.2 %
BILIRUB DIRECT SERPL-MCNC: 0.3 MG/DL (ref 0–0.2)
BILIRUB SERPL-MCNC: 1 MG/DL (ref 0.2–1.3)
BILIRUB SERPL-MCNC: 1.2 MG/DL (ref 0.2–1.3)
BILIRUB SERPL-MCNC: 1.6 MG/DL (ref 0.2–1.3)
BILIRUB UR QL STRIP: NEGATIVE
BILIRUB UR QL STRIP: NEGATIVE
BLD GP AB SCN SERPL QL: NORMAL
BLOOD BANK CMNT PATIENT-IMP: NORMAL
BUN SERPL-MCNC: 15 MG/DL (ref 7–30)
BUN SERPL-MCNC: 22 MG/DL (ref 7–30)
BUN SERPL-MCNC: 23 MG/DL (ref 7–30)
BUN SERPL-MCNC: 23 MG/DL (ref 7–30)
BUN SERPL-MCNC: 24 MG/DL (ref 7–24)
BUN SERPL-MCNC: 24 MG/DL (ref 7–30)
BUN SERPL-MCNC: 26 MG/DL (ref 7–30)
BUN SERPL-MCNC: 51 MG/DL (ref 7–24)
BUN SERPL-MCNC: 90 MG/DL (ref 7–24)
CALCIUM SERPL-MCNC: 7.8 MG/DL (ref 8.5–10.1)
CALCIUM SERPL-MCNC: 8 MG/DL (ref 8.5–10.1)
CALCIUM SERPL-MCNC: 8 MG/DL (ref 8.5–10.1)
CALCIUM SERPL-MCNC: 8.2 MG/DL (ref 8.5–10.1)
CALCIUM SERPL-MCNC: 8.3 MG/DL (ref 8.5–10.1)
CHLORIDE SERPL-SCNC: 103 MMOL/L (ref 94–109)
CHLORIDE SERPL-SCNC: 106 MMOL/L (ref 94–109)
CHLORIDE SERPL-SCNC: 106 MMOL/L (ref 94–109)
CHLORIDE SERPL-SCNC: 107 MMOL/L (ref 94–109)
CHLORIDE SERPL-SCNC: 111 MMOL/L (ref 94–109)
CHLORIDE SERPL-SCNC: 111 MMOL/L (ref 94–109)
CHLORIDE SERPLBLD-SCNC: 105 MMOL/L (ref 98–112)
CHLORIDE SERPLBLD-SCNC: 107 MMOL/L (ref 98–112)
CHLORIDE SERPLBLD-SCNC: 108 MMOL/L (ref 98–112)
CO2 SERPL-SCNC: 23 MMOL/L (ref 20–32)
CO2 SERPL-SCNC: 23 MMOL/L (ref 21–32)
CO2 SERPL-SCNC: 23 MMOL/L (ref 21–32)
CO2 SERPL-SCNC: 24 MMOL/L (ref 20–32)
CO2 SERPL-SCNC: 24 MMOL/L (ref 20–32)
CO2 SERPL-SCNC: 24 MMOL/L (ref 21–32)
CO2 SERPL-SCNC: 25 MMOL/L (ref 20–32)
CO2 SERPL-SCNC: 25 MMOL/L (ref 20–32)
CO2 SERPL-SCNC: 26 MMOL/L (ref 20–32)
COLOR UR AUTO: YELLOW
COLOR UR AUTO: YELLOW
CREAT SERPL-MCNC: 0.75 MG/DL (ref 0.66–1.25)
CREAT SERPL-MCNC: 0.78 MG/DL (ref 0.7–1.3)
CREAT SERPL-MCNC: 0.79 MG/DL (ref 0.66–1.25)
CREAT SERPL-MCNC: 0.8 MG/DL (ref 0.66–1.25)
CREAT SERPL-MCNC: 0.86 MG/DL (ref 0.66–1.25)
CREAT SERPL-MCNC: 0.87 MG/DL (ref 0.66–1.25)
CREAT SERPL-MCNC: 0.9 MG/DL (ref 0.66–1.25)
CREAT SERPL-MCNC: 1.12 MG/DL (ref 0.7–1.3)
CREAT SERPL-MCNC: 1.39 MG/DL (ref 0.7–1.3)
DIFFERENTIAL METHOD BLD: ABNORMAL
DIFFERENTIAL: ABNORMAL
EOSINOPHIL # BLD AUTO: 0.1 10E9/L (ref 0–0.7)
EOSINOPHIL NFR BLD AUTO: 1.3 %
EOSINOPHIL NFR BLD AUTO: 1.4 %
EOSINOPHIL NFR BLD AUTO: 2.2 %
ERYTHROCYTE [DISTWIDTH] IN BLOOD BY AUTOMATED COUNT: 13.4 % (ref 11.5–14.5)
ERYTHROCYTE [DISTWIDTH] IN BLOOD BY AUTOMATED COUNT: 13.8 % (ref 10–15)
ERYTHROCYTE [DISTWIDTH] IN BLOOD BY AUTOMATED COUNT: 14.2 % (ref 10–15)
ERYTHROCYTE [DISTWIDTH] IN BLOOD BY AUTOMATED COUNT: 14.2 % (ref 10–15)
ERYTHROCYTE [DISTWIDTH] IN BLOOD BY AUTOMATED COUNT: 15.2 % (ref 11.5–14.5)
ERYTHROCYTE [DISTWIDTH] IN BLOOD BY AUTOMATED COUNT: 15.9 % (ref 11.5–14.5)
GFR SERPL CREATININE-BSD FRML MDRD: 48 ML/MIN
GFR SERPL CREATININE-BSD FRML MDRD: 73 ML/MIN/{1.73_M2}
GFR SERPL CREATININE-BSD FRML MDRD: 74 ML/MIN/{1.73_M2}
GFR SERPL CREATININE-BSD FRML MDRD: 74 ML/MIN/{1.73_M2}
GFR SERPL CREATININE-BSD FRML MDRD: 77 ML/MIN/{1.73_M2}
GFR SERPL CREATININE-BSD FRML MDRD: 77 ML/MIN/{1.73_M2}
GFR SERPL CREATININE-BSD FRML MDRD: 79 ML/MIN/{1.73_M2}
GFR SERPL CREATININE-BSD FRML MDRD: >60 ML/MIN/1.73M2
GFR SERPL CREATININE-BSD FRML MDRD: >60 ML/MIN/1.73M2
GLUCOSE BLDC GLUCOMTR-MCNC: 101 MG/DL (ref 70–99)
GLUCOSE BLDC GLUCOMTR-MCNC: 103 MG/DL (ref 70–99)
GLUCOSE BLDC GLUCOMTR-MCNC: 104 MG/DL (ref 70–99)
GLUCOSE BLDC GLUCOMTR-MCNC: 108 MG/DL (ref 70–99)
GLUCOSE BLDC GLUCOMTR-MCNC: 109 MG/DL (ref 70–99)
GLUCOSE BLDC GLUCOMTR-MCNC: 109 MG/DL (ref 70–99)
GLUCOSE BLDC GLUCOMTR-MCNC: 111 MG/DL (ref 70–99)
GLUCOSE BLDC GLUCOMTR-MCNC: 114 MG/DL (ref 70–99)
GLUCOSE BLDC GLUCOMTR-MCNC: 116 MG/DL (ref 70–99)
GLUCOSE BLDC GLUCOMTR-MCNC: 118 MG/DL (ref 70–99)
GLUCOSE BLDC GLUCOMTR-MCNC: 119 MG/DL (ref 70–99)
GLUCOSE BLDC GLUCOMTR-MCNC: 119 MG/DL (ref 70–99)
GLUCOSE BLDC GLUCOMTR-MCNC: 122 MG/DL (ref 70–99)
GLUCOSE BLDC GLUCOMTR-MCNC: 122 MG/DL (ref 70–99)
GLUCOSE BLDC GLUCOMTR-MCNC: 123 MG/DL (ref 70–99)
GLUCOSE BLDC GLUCOMTR-MCNC: 133 MG/DL (ref 70–99)
GLUCOSE BLDC GLUCOMTR-MCNC: 163 MG/DL (ref 70–99)
GLUCOSE BLDC GLUCOMTR-MCNC: 163 MG/DL (ref 70–99)
GLUCOSE BLDC GLUCOMTR-MCNC: 189 MG/DL (ref 70–99)
GLUCOSE SERPL-MCNC: 102 MG/DL (ref 70–99)
GLUCOSE SERPL-MCNC: 106 MG/DL (ref 74–106)
GLUCOSE SERPL-MCNC: 110 MG/DL (ref 70–99)
GLUCOSE SERPL-MCNC: 120 MG/DL (ref 70–99)
GLUCOSE SERPL-MCNC: 126 MG/DL (ref 70–99)
GLUCOSE SERPL-MCNC: 129 MG/DL (ref 70–99)
GLUCOSE SERPL-MCNC: 130 MG/DL (ref 74–108)
GLUCOSE SERPL-MCNC: 137 MG/DL (ref 74–106)
GLUCOSE SERPL-MCNC: 157 MG/DL (ref 70–99)
GLUCOSE UR STRIP-MCNC: NEGATIVE MG/DL
GLUCOSE UR STRIP-MCNC: NEGATIVE MG/DL
HBA1C MFR BLD: 6.7 % (ref 0–5.6)
HCT VFR BLD AUTO: 24.9 % (ref 40–54)
HCT VFR BLD AUTO: 27 % (ref 40–54)
HCT VFR BLD AUTO: 29.9 % (ref 40–53)
HCT VFR BLD AUTO: 32.5 % (ref 40–53)
HCT VFR BLD AUTO: 32.6 % (ref 40–54)
HCT VFR BLD AUTO: 33.6 % (ref 40–53)
HEMOGLOBIN: 10.6 GM/DL (ref 14–18)
HEMOGLOBIN: 7.9 GM/DL (ref 14–18)
HEMOGLOBIN: 8.7 GM/DL (ref 14–18)
HGB BLD-MCNC: 10.6 G/DL (ref 13.3–17.7)
HGB BLD-MCNC: 11 G/DL (ref 13.3–17.7)
HGB BLD-MCNC: 11.2 G/DL (ref 13.3–17.7)
HGB BLD-MCNC: 9.8 G/DL (ref 13.3–17.7)
HGB BLD-MCNC: 9.9 G/DL (ref 13.3–17.7)
HGB UR QL STRIP: ABNORMAL
HGB UR QL STRIP: ABNORMAL
IMM GRANULOCYTES # BLD: 0 10E9/L (ref 0–0.4)
IMM GRANULOCYTES NFR BLD: 0.2 %
IMM GRANULOCYTES NFR BLD: 0.3 %
IMM GRANULOCYTES NFR BLD: 0.3 %
INR PPP: 1.8 (ref 1–1.2)
INR PPP: 1.9 (ref 1–1.2)
INR PPP: 2.2 (ref 0.86–1.14)
INR PPP: 2.2 (ref 1–1.2)
INR PPP: 2.43 (ref 0.86–1.14)
INR PPP: 2.43 (ref 0.86–1.14)
INR PPP: 2.54 (ref 0.86–1.14)
INR PPP: 2.57 (ref 0.86–1.14)
INR PPP: 2.6 (ref 1–1.2)
INR PPP: 3.3 (ref 1–1.2)
INR PPP: 3.9 (ref 1–1.2)
INR PPP: 4.4 (ref 1–1.2)
INR PPP: 7.6 (ref 1–1.2)
INR PPP: 7.8 (ref 1–1.2)
INR PPP: NORMAL (ref 0.86–1.14)
INTERPRETATION ECG - MUSE: NORMAL
INTERPRETATION ECG - MUSE: NORMAL
KETONES UR STRIP-MCNC: NEGATIVE MG/DL
KETONES UR STRIP-MCNC: NEGATIVE MG/DL
LEUKOCYTE ESTERASE UR QL STRIP: ABNORMAL
LEUKOCYTE ESTERASE UR QL STRIP: NEGATIVE
LYMPHOCYTES # BLD AUTO: 0.7 10E9/L (ref 0.8–5.3)
LYMPHOCYTES # BLD AUTO: 0.9 10E9/L (ref 0.8–5.3)
LYMPHOCYTES # BLD AUTO: 0.9 10E9/L (ref 0.8–5.3)
LYMPHOCYTES NFR BLD AUTO: 10.9 %
LYMPHOCYTES NFR BLD AUTO: 16 %
LYMPHOCYTES NFR BLD AUTO: 9.2 %
Lab: ABNORMAL
MCH RBC QN AUTO: 29 PG (ref 27–33)
MCH RBC QN AUTO: 29 PG (ref 27–33)
MCH RBC QN AUTO: 30.3 PG (ref 26.5–33)
MCH RBC QN AUTO: 30.7 PG (ref 26.5–33)
MCH RBC QN AUTO: 30.8 PG (ref 26.5–33)
MCH RBC QN AUTO: 31 PG (ref 27–33)
MCHC RBC AUTO-ENTMCNC: 32 GM/DL (ref 33–36)
MCHC RBC AUTO-ENTMCNC: 32 GM/DL (ref 33–66)
MCHC RBC AUTO-ENTMCNC: 32.6 G/DL (ref 31.5–36.5)
MCHC RBC AUTO-ENTMCNC: 33 GM/DL (ref 33–36)
MCHC RBC AUTO-ENTMCNC: 33.1 G/DL (ref 31.5–36.5)
MCHC RBC AUTO-ENTMCNC: 33.3 G/DL (ref 31.5–36.5)
MCV RBC AUTO: 91 FL (ref 80–100)
MCV RBC AUTO: 91 FL (ref 80–100)
MCV RBC AUTO: 92 FL (ref 78–100)
MCV RBC AUTO: 93 FL (ref 78–100)
MCV RBC AUTO: 93 FL (ref 78–100)
MCV RBC AUTO: 94 FL (ref 80–100)
MONOCYTES # BLD AUTO: 0.5 10E9/L (ref 0–1.3)
MONOCYTES # BLD AUTO: 0.7 10E9/L (ref 0–1.3)
MONOCYTES # BLD AUTO: 0.8 10E9/L (ref 0–1.3)
MONOCYTES NFR BLD AUTO: 7.7 %
MONOCYTES NFR BLD AUTO: 9 %
MONOCYTES NFR BLD AUTO: 9.6 %
MUCOUS THREADS #/AREA URNS LPF: PRESENT /LPF
NEUTROPHILS # BLD AUTO: 4.3 10E9/L (ref 1.6–8.3)
NEUTROPHILS # BLD AUTO: 6.1 10E9/L (ref 1.6–8.3)
NEUTROPHILS # BLD AUTO: 6.4 10E9/L (ref 1.6–8.3)
NEUTROPHILS NFR BLD AUTO: 73.6 %
NEUTROPHILS NFR BLD AUTO: 78.4 %
NEUTROPHILS NFR BLD AUTO: 79.5 %
NITRATE UR QL: NEGATIVE
NITRATE UR QL: POSITIVE
NRBC # BLD AUTO: 0 10*3/UL
NRBC BLD AUTO-RTO: 0 /100
NT-PROBNP SERPL-MCNC: 2107 PG/ML (ref 0–1800)
NT-PROBNP SERPL-MCNC: 2112 PG/ML (ref 0–1800)
PH UR STRIP: 6 PH (ref 5–7)
PH UR STRIP: 6.5 PH (ref 5–7)
PLATELET # BLD AUTO: 180 10E9/L (ref 150–450)
PLATELET # BLD AUTO: 191 10E9/L (ref 150–450)
PLATELET # BLD AUTO: 214 10E9/L (ref 150–450)
PLATELET # BLD AUTO: 226 K/UL (ref 150–450)
PLATELET # BLD AUTO: 243 K/UL (ref 150–400)
PLATELET # BLD AUTO: 261 K/UL (ref 150–450)
POTASSIUM SERPL-SCNC: 3.8 MMOL/L (ref 3.4–5.3)
POTASSIUM SERPL-SCNC: 4 MMOL/L (ref 3.4–5.3)
POTASSIUM SERPL-SCNC: 4 MMOL/L (ref 3.4–5.3)
POTASSIUM SERPL-SCNC: 4 MMOL/L (ref 3.5–5.1)
POTASSIUM SERPL-SCNC: 4.2 MMOL/L (ref 3.4–5.3)
POTASSIUM SERPL-SCNC: 4.2 MMOL/L (ref 3.4–5.3)
POTASSIUM SERPL-SCNC: 4.2 MMOL/L (ref 3.5–5.1)
POTASSIUM SERPL-SCNC: 4.4 MMOL/L (ref 3.4–5.3)
POTASSIUM SERPL-SCNC: 4.5 MMOL/L (ref 3.5–5.1)
PROT SERPL-MCNC: 5.9 G/DL (ref 6.8–8.8)
PROT SERPL-MCNC: 6.5 G/DL (ref 6.8–8.8)
PROT SERPL-MCNC: 6.5 G/DL (ref 6.8–8.8)
PSA SERPL-MCNC: 59.6 UG/L (ref 0–4)
RBC # BLD AUTO: 2.74 M/UL (ref 4.6–5.2)
RBC # BLD AUTO: 2.98 M/UL (ref 4.6–6.2)
RBC # BLD AUTO: 3.22 10E12/L (ref 4.4–5.9)
RBC # BLD AUTO: 3.46 M/UL (ref 4.6–6.2)
RBC # BLD AUTO: 3.5 10E12/L (ref 4.4–5.9)
RBC # BLD AUTO: 3.64 10E12/L (ref 4.4–5.9)
RBC #/AREA URNS AUTO: 39 /HPF (ref 0–2)
RBC #/AREA URNS AUTO: 7 /HPF (ref 0–2)
SODIUM SERPL-SCNC: 135 MMOL/L (ref 133–144)
SODIUM SERPL-SCNC: 136 MMOL/L (ref 136–145)
SODIUM SERPL-SCNC: 137 MMOL/L (ref 133–144)
SODIUM SERPL-SCNC: 137 MMOL/L (ref 136–145)
SODIUM SERPL-SCNC: 138 MMOL/L (ref 133–144)
SODIUM SERPL-SCNC: 138 MMOL/L (ref 133–144)
SODIUM SERPL-SCNC: 140 MMOL/L (ref 136–145)
SODIUM SERPL-SCNC: 141 MMOL/L (ref 133–144)
SODIUM SERPL-SCNC: 142 MMOL/L (ref 133–144)
SOURCE: ABNORMAL
SOURCE: ABNORMAL
SP GR UR STRIP: 1.01 (ref 1–1.03)
SP GR UR STRIP: 1.02 (ref 1–1.03)
SPECIMEN EXP DATE BLD: NORMAL
SPECIMEN SOURCE: ABNORMAL
TROPONIN I SERPL-MCNC: 0.02 UG/L (ref 0–0.04)
TROPONIN I SERPL-MCNC: 0.02 UG/L (ref 0–0.04)
TSH SERPL DL<=0.005 MIU/L-ACNC: 0.87 MU/L (ref 0.4–4)
UROBILINOGEN UR STRIP-MCNC: NORMAL MG/DL (ref 0–2)
UROBILINOGEN UR STRIP-MCNC: NORMAL MG/DL (ref 0–2)
WBC # BLD AUTO: 10.7 K/UL (ref 4.3–10.8)
WBC # BLD AUTO: 11.8 K/UL (ref 4–10.8)
WBC # BLD AUTO: 5.8 10E9/L (ref 4–11)
WBC # BLD AUTO: 7.5 K/UL (ref 4.3–10.8)
WBC # BLD AUTO: 7.8 10E9/L (ref 4–11)
WBC # BLD AUTO: 8 10E9/L (ref 4–11)
WBC #/AREA URNS AUTO: 0 /HPF (ref 0–5)
WBC #/AREA URNS AUTO: 72 /HPF (ref 0–5)

## 2019-01-01 PROCEDURE — 36415 COLL VENOUS BLD VENIPUNCTURE: CPT | Performed by: INTERNAL MEDICINE

## 2019-01-01 PROCEDURE — 84443 ASSAY THYROID STIM HORMONE: CPT | Performed by: HOSPITALIST

## 2019-01-01 PROCEDURE — 25000132 ZZH RX MED GY IP 250 OP 250 PS 637: Performed by: EMERGENCY MEDICINE

## 2019-01-01 PROCEDURE — 25000132 ZZH RX MED GY IP 250 OP 250 PS 637: Performed by: HOSPITALIST

## 2019-01-01 PROCEDURE — 99309 SBSQ NF CARE MODERATE MDM 30: CPT | Performed by: NURSE PRACTITIONER

## 2019-01-01 PROCEDURE — 85025 COMPLETE CBC W/AUTO DIFF WBC: CPT | Performed by: EMERGENCY MEDICINE

## 2019-01-01 PROCEDURE — 96365 THER/PROPH/DIAG IV INF INIT: CPT

## 2019-01-01 PROCEDURE — 99306 1ST NF CARE HIGH MDM 50: CPT | Performed by: INTERNAL MEDICINE

## 2019-01-01 PROCEDURE — 99207 ZZC CDG-MDM COMPONENT: MEETS LOW - DOWN CODED: CPT | Performed by: INTERNAL MEDICINE

## 2019-01-01 PROCEDURE — 80048 BASIC METABOLIC PNL TOTAL CA: CPT | Performed by: HOSPITALIST

## 2019-01-01 PROCEDURE — 40000986 XR CHEST 2 VW

## 2019-01-01 PROCEDURE — 99233 SBSQ HOSP IP/OBS HIGH 50: CPT | Performed by: INTERNAL MEDICINE

## 2019-01-01 PROCEDURE — 74178 CT ABD&PLV WO CNTR FLWD CNTR: CPT

## 2019-01-01 PROCEDURE — 83036 HEMOGLOBIN GLYCOSYLATED A1C: CPT | Performed by: HOSPITALIST

## 2019-01-01 PROCEDURE — 99221 1ST HOSP IP/OBS SF/LOW 40: CPT | Performed by: INTERNAL MEDICINE

## 2019-01-01 PROCEDURE — 97530 THERAPEUTIC ACTIVITIES: CPT | Mod: GP

## 2019-01-01 PROCEDURE — 40000264 ECHOCARDIOGRAM COMPLETE

## 2019-01-01 PROCEDURE — 12000000 ZZH R&B MED SURG/OB

## 2019-01-01 PROCEDURE — 00000146 ZZHCL STATISTIC GLUCOSE BY METER IP

## 2019-01-01 PROCEDURE — 99309 SBSQ NF CARE MODERATE MDM 30: CPT | Performed by: INTERNAL MEDICINE

## 2019-01-01 PROCEDURE — 25000128 H RX IP 250 OP 636: Performed by: HOSPITALIST

## 2019-01-01 PROCEDURE — 25000132 ZZH RX MED GY IP 250 OP 250 PS 637

## 2019-01-01 PROCEDURE — 99285 EMERGENCY DEPT VISIT HI MDM: CPT

## 2019-01-01 PROCEDURE — 85018 HEMOGLOBIN: CPT | Performed by: HOSPITALIST

## 2019-01-01 PROCEDURE — 86901 BLOOD TYPING SEROLOGIC RH(D): CPT | Performed by: INTERNAL MEDICINE

## 2019-01-01 PROCEDURE — 87186 SC STD MICRODIL/AGAR DIL: CPT | Performed by: EMERGENCY MEDICINE

## 2019-01-01 PROCEDURE — 36415 COLL VENOUS BLD VENIPUNCTURE: CPT | Performed by: HOSPITALIST

## 2019-01-01 PROCEDURE — 80053 COMPREHEN METABOLIC PANEL: CPT | Performed by: HOSPITALIST

## 2019-01-01 PROCEDURE — 87088 URINE BACTERIA CULTURE: CPT | Performed by: EMERGENCY MEDICINE

## 2019-01-01 PROCEDURE — 85018 HEMOGLOBIN: CPT | Performed by: INTERNAL MEDICINE

## 2019-01-01 PROCEDURE — 85610 PROTHROMBIN TIME: CPT | Performed by: HOSPITALIST

## 2019-01-01 PROCEDURE — 25500064 ZZH RX 255 OP 636: Performed by: HOSPITALIST

## 2019-01-01 PROCEDURE — 84153 ASSAY OF PSA TOTAL: CPT | Performed by: HOSPITALIST

## 2019-01-01 PROCEDURE — 25000132 ZZH RX MED GY IP 250 OP 250 PS 637: Performed by: INTERNAL MEDICINE

## 2019-01-01 PROCEDURE — 93306 TTE W/DOPPLER COMPLETE: CPT | Mod: 26 | Performed by: INTERNAL MEDICINE

## 2019-01-01 PROCEDURE — 25000125 ZZHC RX 250

## 2019-01-01 PROCEDURE — 76705 ECHO EXAM OF ABDOMEN: CPT

## 2019-01-01 PROCEDURE — 99310 SBSQ NF CARE HIGH MDM 45: CPT | Performed by: NURSE PRACTITIONER

## 2019-01-01 PROCEDURE — 71260 CT THORAX DX C+: CPT

## 2019-01-01 PROCEDURE — 51702 INSERT TEMP BLADDER CATH: CPT

## 2019-01-01 PROCEDURE — 96361 HYDRATE IV INFUSION ADD-ON: CPT

## 2019-01-01 PROCEDURE — 87086 URINE CULTURE/COLONY COUNT: CPT | Performed by: EMERGENCY MEDICINE

## 2019-01-01 PROCEDURE — 81001 URINALYSIS AUTO W/SCOPE: CPT | Performed by: EMERGENCY MEDICINE

## 2019-01-01 PROCEDURE — 99223 1ST HOSP IP/OBS HIGH 75: CPT | Mod: AI | Performed by: HOSPITALIST

## 2019-01-01 PROCEDURE — 25000125 ZZHC RX 250: Performed by: HOSPITALIST

## 2019-01-01 PROCEDURE — 83880 ASSAY OF NATRIURETIC PEPTIDE: CPT | Performed by: EMERGENCY MEDICINE

## 2019-01-01 PROCEDURE — 99239 HOSP IP/OBS DSCHRG MGMT >30: CPT | Performed by: INTERNAL MEDICINE

## 2019-01-01 PROCEDURE — 80053 COMPREHEN METABOLIC PANEL: CPT | Performed by: EMERGENCY MEDICINE

## 2019-01-01 PROCEDURE — 86900 BLOOD TYPING SEROLOGIC ABO: CPT | Performed by: INTERNAL MEDICINE

## 2019-01-01 PROCEDURE — 93005 ELECTROCARDIOGRAM TRACING: CPT

## 2019-01-01 PROCEDURE — 80048 BASIC METABOLIC PNL TOTAL CA: CPT | Performed by: EMERGENCY MEDICINE

## 2019-01-01 PROCEDURE — 51798 US URINE CAPACITY MEASURE: CPT

## 2019-01-01 PROCEDURE — 80076 HEPATIC FUNCTION PANEL: CPT | Performed by: EMERGENCY MEDICINE

## 2019-01-01 PROCEDURE — 99232 SBSQ HOSP IP/OBS MODERATE 35: CPT | Performed by: INTERNAL MEDICINE

## 2019-01-01 PROCEDURE — 84484 ASSAY OF TROPONIN QUANT: CPT | Performed by: EMERGENCY MEDICINE

## 2019-01-01 PROCEDURE — 97110 THERAPEUTIC EXERCISES: CPT | Mod: GP

## 2019-01-01 PROCEDURE — 99285 EMERGENCY DEPT VISIT HI MDM: CPT | Mod: 25

## 2019-01-01 PROCEDURE — 86850 RBC ANTIBODY SCREEN: CPT | Performed by: INTERNAL MEDICINE

## 2019-01-01 PROCEDURE — 85025 COMPLETE CBC W/AUTO DIFF WBC: CPT | Performed by: HOSPITALIST

## 2019-01-01 PROCEDURE — 99308 SBSQ NF CARE LOW MDM 20: CPT | Performed by: NURSE PRACTITIONER

## 2019-01-01 PROCEDURE — 71045 X-RAY EXAM CHEST 1 VIEW: CPT

## 2019-01-01 PROCEDURE — 97161 PT EVAL LOW COMPLEX 20 MIN: CPT | Mod: GP

## 2019-01-01 PROCEDURE — 25000128 H RX IP 250 OP 636: Performed by: EMERGENCY MEDICINE

## 2019-01-01 RX ORDER — WARFARIN SODIUM 2.5 MG/1
2.5 TABLET ORAL
Status: DISCONTINUED | OUTPATIENT
Start: 2019-01-01 | End: 2019-01-01 | Stop reason: HOSPADM

## 2019-01-01 RX ORDER — NICOTINE POLACRILEX 4 MG
15-30 LOZENGE BUCCAL
Status: DISCONTINUED | OUTPATIENT
Start: 2019-01-01 | End: 2019-01-01 | Stop reason: HOSPADM

## 2019-01-01 RX ORDER — WARFARIN SODIUM 2.5 MG/1
2.5 TABLET ORAL
Status: COMPLETED | OUTPATIENT
Start: 2019-01-01 | End: 2019-01-01

## 2019-01-01 RX ORDER — FUROSEMIDE 40 MG
40 TABLET ORAL EVERY MORNING
Start: 2019-01-01

## 2019-01-01 RX ORDER — CEFTRIAXONE 1 G/1
1 INJECTION, POWDER, FOR SOLUTION INTRAMUSCULAR; INTRAVENOUS ONCE
Status: COMPLETED | OUTPATIENT
Start: 2019-01-01 | End: 2019-01-01

## 2019-01-01 RX ORDER — FUROSEMIDE 40 MG
40 TABLET ORAL EVERY MORNING
Start: 2019-01-01 | End: 2019-01-01

## 2019-01-01 RX ORDER — LORAZEPAM 0.5 MG/1
0.5 TABLET ORAL EVERY 4 HOURS PRN
Qty: 30 TABLET | Refills: 1 | Status: SHIPPED | OUTPATIENT
Start: 2019-01-01

## 2019-01-01 RX ORDER — ACETAMINOPHEN 500 MG
500 TABLET ORAL ONCE
Status: COMPLETED | OUTPATIENT
Start: 2019-01-01 | End: 2019-01-01

## 2019-01-01 RX ORDER — AMOXICILLIN 250 MG
1 CAPSULE ORAL 2 TIMES DAILY PRN
Status: DISCONTINUED | OUTPATIENT
Start: 2019-01-01 | End: 2019-01-01 | Stop reason: HOSPADM

## 2019-01-01 RX ORDER — MORPHINE SULFATE 10 MG/5ML
2.5 SOLUTION ORAL EVERY 4 HOURS PRN
Qty: 40 ML | Refills: 0 | Status: SHIPPED | OUTPATIENT
Start: 2019-01-01 | End: 2019-01-01

## 2019-01-01 RX ORDER — ACETAMINOPHEN 650 MG/1
650 SUPPOSITORY RECTAL EVERY 4 HOURS PRN
Status: DISCONTINUED | OUTPATIENT
Start: 2019-01-01 | End: 2019-01-01 | Stop reason: HOSPADM

## 2019-01-01 RX ORDER — FUROSEMIDE 40 MG
40 TABLET ORAL EVERY MORNING
Status: DISCONTINUED | OUTPATIENT
Start: 2019-01-01 | End: 2019-01-01 | Stop reason: HOSPADM

## 2019-01-01 RX ORDER — LIDOCAINE 40 MG/G
CREAM TOPICAL
Status: DISCONTINUED | OUTPATIENT
Start: 2019-01-01 | End: 2019-01-01 | Stop reason: HOSPADM

## 2019-01-01 RX ORDER — AMOXICILLIN 250 MG
2 CAPSULE ORAL 2 TIMES DAILY PRN
Status: DISCONTINUED | OUTPATIENT
Start: 2019-01-01 | End: 2019-01-01 | Stop reason: HOSPADM

## 2019-01-01 RX ORDER — ACETAMINOPHEN 325 MG/1
650 TABLET ORAL EVERY 4 HOURS PRN
Status: DISCONTINUED | OUTPATIENT
Start: 2019-01-01 | End: 2019-01-01 | Stop reason: HOSPADM

## 2019-01-01 RX ORDER — AMOXICILLIN 875 MG
875 TABLET ORAL EVERY 12 HOURS SCHEDULED
Status: DISCONTINUED | OUTPATIENT
Start: 2019-01-01 | End: 2019-01-01 | Stop reason: HOSPADM

## 2019-01-01 RX ORDER — FUROSEMIDE 10 MG/ML
20 INJECTION INTRAMUSCULAR; INTRAVENOUS 2 TIMES DAILY
Status: DISCONTINUED | OUTPATIENT
Start: 2019-01-01 | End: 2019-01-01

## 2019-01-01 RX ORDER — METOPROLOL SUCCINATE 25 MG/1
25 TABLET, EXTENDED RELEASE ORAL AT BEDTIME
Status: DISCONTINUED | OUTPATIENT
Start: 2019-01-01 | End: 2019-01-01 | Stop reason: HOSPADM

## 2019-01-01 RX ORDER — DEXTROSE MONOHYDRATE 25 G/50ML
25-50 INJECTION, SOLUTION INTRAVENOUS
Status: DISCONTINUED | OUTPATIENT
Start: 2019-01-01 | End: 2019-01-01 | Stop reason: HOSPADM

## 2019-01-01 RX ORDER — ATORVASTATIN CALCIUM 20 MG/1
20 TABLET, FILM COATED ORAL AT BEDTIME
Status: DISCONTINUED | OUTPATIENT
Start: 2019-01-01 | End: 2019-01-01 | Stop reason: HOSPADM

## 2019-01-01 RX ORDER — POLYETHYLENE GLYCOL 3350 17 G/17G
17 POWDER, FOR SOLUTION ORAL DAILY PRN
Status: DISCONTINUED | OUTPATIENT
Start: 2019-01-01 | End: 2019-01-01 | Stop reason: HOSPADM

## 2019-01-01 RX ORDER — ACETAMINOPHEN 650 MG/1
650 SUPPOSITORY RECTAL EVERY 4 HOURS PRN
Status: DISCONTINUED | OUTPATIENT
Start: 2019-01-01 | End: 2019-01-01

## 2019-01-01 RX ORDER — LANOLIN ALCOHOL/MO/W.PET/CERES
3 CREAM (GRAM) TOPICAL
Status: DISCONTINUED | OUTPATIENT
Start: 2019-01-01 | End: 2019-01-01 | Stop reason: HOSPADM

## 2019-01-01 RX ORDER — ACETAMINOPHEN 325 MG/1
650 TABLET ORAL EVERY 6 HOURS PRN
COMMUNITY

## 2019-01-01 RX ORDER — AMOXICILLIN 875 MG
875 TABLET ORAL EVERY 12 HOURS
Qty: 20 TABLET | DISCHARGE
Start: 2019-01-01 | End: 2019-01-01

## 2019-01-01 RX ORDER — ONDANSETRON 4 MG/1
4 TABLET, ORALLY DISINTEGRATING ORAL EVERY 6 HOURS PRN
Status: DISCONTINUED | OUTPATIENT
Start: 2019-01-01 | End: 2019-01-01 | Stop reason: HOSPADM

## 2019-01-01 RX ORDER — ONDANSETRON 2 MG/ML
4 INJECTION INTRAMUSCULAR; INTRAVENOUS EVERY 6 HOURS PRN
Status: DISCONTINUED | OUTPATIENT
Start: 2019-01-01 | End: 2019-01-01 | Stop reason: HOSPADM

## 2019-01-01 RX ORDER — IPRATROPIUM BROMIDE AND ALBUTEROL SULFATE 2.5; .5 MG/3ML; MG/3ML
1 SOLUTION RESPIRATORY (INHALATION) 3 TIMES DAILY
COMMUNITY

## 2019-01-01 RX ORDER — IOPAMIDOL 755 MG/ML
120 INJECTION, SOLUTION INTRAVASCULAR ONCE
Status: COMPLETED | OUTPATIENT
Start: 2019-01-01 | End: 2019-01-01

## 2019-01-01 RX ORDER — NALOXONE HYDROCHLORIDE 0.4 MG/ML
.1-.4 INJECTION, SOLUTION INTRAMUSCULAR; INTRAVENOUS; SUBCUTANEOUS
Status: DISCONTINUED | OUTPATIENT
Start: 2019-01-01 | End: 2019-01-01 | Stop reason: HOSPADM

## 2019-01-01 RX ORDER — CEFTRIAXONE 2 G/1
2 INJECTION, POWDER, FOR SOLUTION INTRAMUSCULAR; INTRAVENOUS EVERY 24 HOURS
Status: DISCONTINUED | OUTPATIENT
Start: 2019-01-01 | End: 2019-01-01

## 2019-01-01 RX ORDER — WARFARIN SODIUM 2.5 MG/1
2.5 TABLET ORAL
Status: DISCONTINUED | OUTPATIENT
Start: 2019-01-01 | End: 2019-01-01

## 2019-01-01 RX ORDER — FUROSEMIDE 40 MG
40 TABLET ORAL EVERY MORNING
Qty: 30 TABLET | DISCHARGE
Start: 2019-01-01 | End: 2019-01-01

## 2019-01-01 RX ORDER — ACETAMINOPHEN 325 MG/1
650 TABLET ORAL EVERY 4 HOURS PRN
Status: DISCONTINUED | OUTPATIENT
Start: 2019-01-01 | End: 2019-01-01

## 2019-01-01 RX ORDER — BISACODYL 10 MG
10 SUPPOSITORY, RECTAL RECTAL DAILY PRN
Status: DISCONTINUED | OUTPATIENT
Start: 2019-01-01 | End: 2019-01-01 | Stop reason: HOSPADM

## 2019-01-01 RX ORDER — LISINOPRIL 10 MG/1
10 TABLET ORAL AT BEDTIME
Status: DISCONTINUED | OUTPATIENT
Start: 2019-01-01 | End: 2019-01-01 | Stop reason: HOSPADM

## 2019-01-01 RX ADMIN — ACETAMINOPHEN 650 MG: 325 TABLET, FILM COATED ORAL at 10:56

## 2019-01-01 RX ADMIN — FUROSEMIDE 20 MG: 10 INJECTION, SOLUTION INTRAVENOUS at 21:11

## 2019-01-01 RX ADMIN — SODIUM CHLORIDE 60 ML: 9 INJECTION, SOLUTION INTRAVENOUS at 17:46

## 2019-01-01 RX ADMIN — AMOXICILLIN 875 MG: 875 TABLET, FILM COATED ORAL at 21:28

## 2019-01-01 RX ADMIN — ATORVASTATIN CALCIUM 20 MG: 20 TABLET, FILM COATED ORAL at 21:28

## 2019-01-01 RX ADMIN — METOPROLOL SUCCINATE 25 MG: 25 TABLET, EXTENDED RELEASE ORAL at 22:21

## 2019-01-01 RX ADMIN — CEFTRIAXONE SODIUM 2 G: 2 INJECTION, POWDER, FOR SOLUTION INTRAMUSCULAR; INTRAVENOUS at 08:20

## 2019-01-01 RX ADMIN — LISINOPRIL 10 MG: 10 TABLET ORAL at 21:29

## 2019-01-01 RX ADMIN — FUROSEMIDE 40 MG: 40 TABLET ORAL at 16:26

## 2019-01-01 RX ADMIN — ACETAMINOPHEN 500 MG: 500 TABLET, FILM COATED ORAL at 19:07

## 2019-01-01 RX ADMIN — CEFTRIAXONE 1 G: 1 INJECTION, POWDER, FOR SOLUTION INTRAMUSCULAR; INTRAVENOUS at 20:06

## 2019-01-01 RX ADMIN — METOPROLOL SUCCINATE 25 MG: 25 TABLET, EXTENDED RELEASE ORAL at 21:09

## 2019-01-01 RX ADMIN — AMOXICILLIN 875 MG: 875 TABLET, FILM COATED ORAL at 08:04

## 2019-01-01 RX ADMIN — WARFARIN SODIUM 2.5 MG: 2.5 TABLET ORAL at 17:07

## 2019-01-01 RX ADMIN — IOPAMIDOL 120 ML: 755 INJECTION, SOLUTION INTRAVENOUS at 17:46

## 2019-01-01 RX ADMIN — ATORVASTATIN CALCIUM 20 MG: 20 TABLET, FILM COATED ORAL at 22:22

## 2019-01-01 RX ADMIN — FUROSEMIDE 40 MG: 40 TABLET ORAL at 08:21

## 2019-01-01 RX ADMIN — AMOXICILLIN 875 MG: 875 TABLET, FILM COATED ORAL at 10:42

## 2019-01-01 RX ADMIN — FUROSEMIDE 20 MG: 10 INJECTION, SOLUTION INTRAVENOUS at 08:48

## 2019-01-01 RX ADMIN — LIDOCAINE HYDROCHLORIDE 10 ML: 20 JELLY TOPICAL at 18:17

## 2019-01-01 RX ADMIN — ATORVASTATIN CALCIUM 20 MG: 20 TABLET, FILM COATED ORAL at 21:01

## 2019-01-01 RX ADMIN — METOPROLOL SUCCINATE 25 MG: 25 TABLET, EXTENDED RELEASE ORAL at 21:29

## 2019-01-01 RX ADMIN — FUROSEMIDE 20 MG: 10 INJECTION, SOLUTION INTRAVENOUS at 22:21

## 2019-01-01 RX ADMIN — FUROSEMIDE 20 MG: 10 INJECTION, SOLUTION INTRAVENOUS at 07:58

## 2019-01-01 RX ADMIN — WARFARIN SODIUM 2.5 MG: 2.5 TABLET ORAL at 18:45

## 2019-01-01 RX ADMIN — ACETAMINOPHEN 650 MG: 325 TABLET, FILM COATED ORAL at 11:54

## 2019-01-01 RX ADMIN — LISINOPRIL 10 MG: 10 TABLET ORAL at 21:09

## 2019-01-01 RX ADMIN — LISINOPRIL 10 MG: 10 TABLET ORAL at 21:01

## 2019-01-01 RX ADMIN — LISINOPRIL 10 MG: 10 TABLET ORAL at 22:22

## 2019-01-01 RX ADMIN — FUROSEMIDE 40 MG: 40 TABLET ORAL at 08:04

## 2019-01-01 RX ADMIN — CEFTRIAXONE SODIUM 2 G: 2 INJECTION, POWDER, FOR SOLUTION INTRAMUSCULAR; INTRAVENOUS at 08:48

## 2019-01-01 RX ADMIN — SODIUM CHLORIDE 500 ML: 9 INJECTION, SOLUTION INTRAVENOUS at 18:55

## 2019-01-01 RX ADMIN — CEFTRIAXONE SODIUM 2 G: 2 INJECTION, POWDER, FOR SOLUTION INTRAMUSCULAR; INTRAVENOUS at 07:56

## 2019-01-01 RX ADMIN — HUMAN ALBUMIN MICROSPHERES AND PERFLUTREN 9 ML: 10; .22 INJECTION, SOLUTION INTRAVENOUS at 10:12

## 2019-01-01 RX ADMIN — ATORVASTATIN CALCIUM 20 MG: 20 TABLET, FILM COATED ORAL at 21:09

## 2019-01-01 RX ADMIN — METOPROLOL SUCCINATE 25 MG: 25 TABLET, EXTENDED RELEASE ORAL at 21:01

## 2019-01-01 RX ADMIN — WARFARIN SODIUM 2.5 MG: 2.5 TABLET ORAL at 22:22

## 2019-01-01 ASSESSMENT — ACTIVITIES OF DAILY LIVING (ADL)
WHICH_OF_THE_ABOVE_FUNCTIONAL_RISKS_HAD_A_RECENT_ONSET_OR_CHANGE?: AMBULATION
RETIRED_COMMUNICATION: 0-->UNDERSTANDS/COMMUNICATES WITHOUT DIFFICULTY
AMBULATION: 3-->ASSISTIVE EQUIPMENT AND PERSON
ADLS_ACUITY_SCORE: 21
COGNITION: 0 - NO COGNITION ISSUES REPORTED
ADLS_ACUITY_SCORE: 23
FALL_HISTORY_WITHIN_LAST_SIX_MONTHS: YES
ADLS_ACUITY_SCORE: 21
ADLS_ACUITY_SCORE: 23
ADLS_ACUITY_SCORE: 21
ADLS_ACUITY_SCORE: 23
TOILETING: 0-->INDEPENDENT
ADLS_ACUITY_SCORE: 21
RETIRED_EATING: 0-->INDEPENDENT
ADLS_ACUITY_SCORE: 21
DRESS: 2-->ASSISTIVE PERSON
SWALLOWING: 0-->SWALLOWS FOODS/LIQUIDS WITHOUT DIFFICULTY
ADLS_ACUITY_SCORE: 21
TRANSFERRING: 3-->ASSISTIVE EQUIPMENT AND PERSON
ADLS_ACUITY_SCORE: 21
NUMBER_OF_TIMES_PATIENT_HAS_FALLEN_WITHIN_LAST_SIX_MONTHS: 1
ADLS_ACUITY_SCORE: 21
BATHING: 3-->ASSISTIVE EQUIPMENT AND PERSON
ADLS_ACUITY_SCORE: 21

## 2019-01-01 ASSESSMENT — ENCOUNTER SYMPTOMS
VOMITING: 0
LIGHT-HEADEDNESS: 1
DIZZINESS: 1
WOUND: 1
ABDOMINAL PAIN: 0
FEVER: 0
FATIGUE: 1
WEAKNESS: 1
SHORTNESS OF BREATH: 0
ACTIVITY CHANGE: 1
APPETITE CHANGE: 1
DIARRHEA: 0
ABDOMINAL PAIN: 0

## 2019-01-01 ASSESSMENT — MIFFLIN-ST. JEOR
SCORE: 1622
SCORE: 1590.19
SCORE: 1602
SCORE: 1590.19
SCORE: 1619
SCORE: 1623
SCORE: 1598

## 2019-01-10 NOTE — TELEPHONE ENCOUNTER
Called for delinquent inr. [place] home lab notified to draw 3rd Friday of every month unless inr needed sooner. Please call dosing to his son. Jan and feb draw set up with in home lab. Son said he is usually in lobby around 11 and in home lab connection made aware and made note of. They are not always able to accomodates requests.  Shelley Ceron,Clinic Rn  [facility]

## 2019-02-14 NOTE — ED PROVIDER NOTES
"  History     Chief Complaint:  \"I feel like a dish rag.\"    HPI   Bret Merino is a 89 year old male who presents with dizziness. The patient has a complex past medical history most notable for hypertension, cardiomyopathy, CHF, and atrial flutter/fib on Coumadin with Medtronic ICD in place. The patient most recently had his ICD replaced on 11/12/2018 and it has otherwise been functioning as expected. The patient currently resides at home alone where he manages most of his own ADLs and ambulates with assistance of a walker. Over the past couple of days however, the patient states he has been feeling weak and dizzy/lightheaded. He has not wanted to eat as much although he has been making sure to eat soup at home. However, he was feeling increasingly weak and tired, worrisome for falling if he doesn't get better, and he subsequently called EMS this morning to be brought here for evaluation. He has no chest pain or shortness of breath. He describes global weakness, dizziness, and general malaise. He otherwise has had no recent medication changes. He denies any known fevers, chills, cough, abdominal pain, or constipation.     Of note, the patient states he has had diarrhea off and on for 4 weeks and has since felt more fatigued since his pacemaker was replaced.    Allergies:  Aleve [Naproxen]      Medications:    Lipitor  Lisinopril  Toprol-XL  Warfarin     Past Medical History:    CHF  Atrial flutter/fibrillation  ICE in situ  Pre-diabetes  Prostate cancerHypertension  DHD  Coronary atherosclerosis  Dyslipidemia   Cardiomyopathy    Past Surgical History:    Bilateral knee arthroplasty  Shoulder arthroplasty  Right total hip arthroplasty  Coronary angioplasty  Implant Pacemaker   Medtronic - [id number]  Orchiectomy scrotal bilateral     Family History:    History reviewed. No pertinent family history.      Social History:  Smoking Status: Never Smoker  Alcohol Use: No  Patient presents alone.   Marital Status:  "   Lives independently      Review of Systems   Constitutional: Positive for activity change, appetite change and fatigue. Negative for fever.   Respiratory: Negative for shortness of breath.    Cardiovascular: Negative for chest pain.   Gastrointestinal: Negative for abdominal pain, diarrhea and vomiting.   Neurological: Positive for dizziness and light-headedness.   All other systems reviewed and are negative.      Physical Exam     Patient Vitals for the past 24 hrs:   BP Temp Temp src Pulse Heart Rate Resp SpO2 Height Weight   02/14/19 1245 -- -- -- -- 64 18 99 % -- --   02/14/19 1203 -- -- -- -- 60 19 99 % -- --   02/14/19 1202 -- -- -- -- 60 14 100 % -- --   02/14/19 1145 -- -- -- -- 60 17 99 % -- --   02/14/19 1128 -- -- -- -- 61 17 99 % -- --   02/14/19 1112 -- -- -- -- 60 15 98 % -- --   02/14/19 1110 168/74 -- -- 61 64 13 99 % -- --   02/14/19 1030 164/75 -- -- 60 61 19 99 % -- --   02/14/19 1013 152/70 -- -- 61 -- 16 99 % -- --   02/14/19 0910 (!) 147/108 97.6  F (36.4  C) Oral 65 -- 16 99 % 1.829 m (6') 90.7 kg (200 lb)        Physical Exam  GENERAL: well developed, pleasant  HEAD: atraumatic  EYES: pupils reactive, extraocular muscles intact, conjunctivae normal  ENT:  mucus membranes moist  NECK:  trachea midline, normal range of motion  RESPIRATORY: no tachypnea. Few crackles in left lung base  CVS: normal S1/S2, no murmurs, intact distal pulses. Pacemaker left chest wall.  ABDOMEN: soft, nontender, nondistention  MUSCULOSKELETAL: no deformities  SKIN: warm and dry, no acute rashes or ulceration  NEURO: GCS 15, cranial nerves intact, alert and oriented x3  PSYCH:  Mood/affect normal    Emergency Department Course     Imaging:  Radiographic findings were communicated with the patient who voiced understanding of the findings.    X-ray Chest, 2 views:  No radiographic evidence of acute chest abnormality.   Result per radiology.     US Abdomen limited:  Unremarkable right upper quadrant ultrasound.  "  As read by Radiology.     Laboratory:  0908 - Troponin: <0.015   BNP: 2,112 (H)  CBC: HGB 11.2 (L), o/w WNL (WBC 8.0, )   CMP: Glucose 120 (H), Ca 8.2 (L), Bilirubin total 1.6 (H), Albumin 3.0 (L), Protein total 6.5 (L), Alkphos 225 (H), o/w WNL (Creatinine 0.75)   UA: Small blood, RBC 39 (H),o/w negative    Interventions:  Medications - No data to display        Emergency Department Course:  Past medical records, nursing notes, and vitals reviewed.  0900: I performed an exam of the patient and obtained history, as documented above.     IV inserted and blood drawn.     The patient was sent for a X-ray while in the emergency department, findings above.     1206: I rechecked the patient. Explained findings to patient.      The patient was sent for a ultrasound while in the emergency department, findings above.     1258: I rechecked the patient. Explained findings to patient.     Impression & Plan          CMS Diagnoses:         Medical Decision Making:  Patient presents with generalized weakness and \"I feel like a dish rag \".  Broad differential ranging from medication, infectious etiology, pacemaker malfunction, deconditioning, dehydration, viral illness, electronic disturbance amongst others.  Daughter notes that he has had on and off diarrhea for the last 3-4 weeks.  Did note some decrease in appetite and noted an elevated and his bilirubin so ultrasound was obtained and and is negative.  Patient is otherwise a fairly unremarkable workup.  We did do a road test in the ER and he got up with his walker and walked on the hallway and look to be safe for discharge and daughter agrees.  Patient feels comfortable going home.  We will have him follow-up with his primary care doctor otherwise return if worse.  Critical Care time:  0    Diagnosis:    ICD-10-CM    1. Generalized muscle weakness M62.81    2. Urinary retention R33.9        Disposition:  discharged to home    Discharge Medications:     Medication List "      There are no discharge medications for this visit.           Jaison Austin  2/14/2019    EMERGENCY DEPARTMENT  I, Jaison Austin, am serving as a scribe at 12:58 PM on 2/14/2019 to document services personally performed by Shekhar Treviño MD based on my observations and the provider's statements to me.       Shekhar Treviño MD  02/19/19 1108

## 2019-02-14 NOTE — ED TRIAGE NOTES
Recent pacemaker placed one month ago. Pt denies GI s/s, denies urinary s/s, denies respiratory s/s. Uses walker normally with ambulation.

## 2019-02-14 NOTE — ED AVS SNAPSHOT
Emergency Department  [address] [place] [address]  Phone:  [phone]  Fax:  [phone]                                    Bret Merino   MRN: [id number]    Department:   Emergency Department   Date of Visit:  2/14/2019           After Visit Summary Signature Page    I have received my discharge instructions, and my questions have been answered. I have discussed any challenges I see with this plan with the nurse or doctor.    ..........................................................................................................................................  Patient/Patient Representative Signature      ..........................................................................................................................................  Patient Representative Print Name and Relationship to Patient    ..................................................               ................................................  Date                                   Time    ..........................................................................................................................................  Reviewed by Signature/Title    ...................................................              ..............................................  Date                                               Time          22EPIC Rev 08/18

## 2019-02-19 NOTE — TELEPHONE ENCOUNTER
Received faxed request from [facility] stating that patient will be receiving temporary care coordination. They requested signed medication orders, most recent office notes, and an advanced directive if there is one on file.     Signed medication list, office notes, and advanced directive faxed as requested to [phone].

## 2019-02-22 PROBLEM — N39.0 UTI (URINARY TRACT INFECTION): Status: ACTIVE | Noted: 2019-01-01

## 2019-02-22 NOTE — TELEPHONE ENCOUNTER
Son states patient is currently in a temporary Rehab facility but they ware trying to get him moved to a new long term facility tomorrow.    Family is requesting the following be faxed to the new facility's business office:    Order admitting patient to long term care facility  Last office visit summary  Signed med list.    Please advise if an order can be written for admittance.    Patient will transfer to:    [facility]  [address] [place] [address]  [address]  P. [phone]  F. [phone]    Thank you.  Ariadna Acuña, RN

## 2019-02-22 NOTE — TELEPHONE ENCOUNTER
Letter and information faxed to below referenced fax number.  Family informed.  Ariadna Acuña RN

## 2019-02-22 NOTE — ED NOTES
EMS arrival:    Patient has increased weakness over past month.  Difficulty walking on own.  Multiple skin tears to bilateral arms.  Last fall 1 week ago.  Son requesting admission.  Son takes care of him during day.  Patient lives in assisted living at this time.  Resides in [facility] [address] [place], [place].  On blood thinners.  No recent injury.  No new skin tears.  Legs get weak.  Going to bathroom every hour.    Son cell # [phone].  Not planning to come to ED.

## 2019-02-22 NOTE — TELEPHONE ENCOUNTER
Reason for Call:  Other     Detailed comments:  Patients son, Len, is calling to see if he can get orders to enter his father into a nursing home.  Patient has recently gotten worse and he needs to get him into a nursing home ASA.   [facility] phone -[phone]; fax [phone]. Please call Len to discuss.    Phone Number Patient can be reached at: Other phone number:  Len/ [phone]    Best Time: any    Can we leave a detailed message on this number? YES    Call taken on 2/22/2019 at 10:43 AM by Adamaris Van

## 2019-02-22 NOTE — ED PROVIDER NOTES
"  History     Chief Complaint:  Generalized weaknes    HPI   Bret Merino is a 89 year old male who presents to the emergency department today for evaluation of generalized weakness. Per EMS report, patient is currently living at an assisted living facility and the son is taking care of him every day. The son wanted the patient to come into the hospital for admission because he has increased weakness over the past month and it is difficult for the son to keep caring for him. The son was at the assisted living when the paramedics came and informed them that he has tried to find a nursing home placement, but they are all full. The patient has been having increased falls with the last fall being 4 days ago (denies hitting his head), but no new injuries that happened today. Additionally, the patient is having to go to urinate every hour. The patient states \"I feel like a dish rag.\" He endorses that he fell Monday with a skin abrasion to the right elbow. Patient denies abdominal pain or head trauma.    Allergies:  Naproxen     Medications:    Lipitor  Lisinopril  Metoprolol succinate  Warfarin      Past Medical History:    Skin cancer  Hypertension  Coronary atherosclerosis  Prostate cancer  Cardiac conduction disorder  ICD in place  Atrial fibrillation  Chronic diastolic heart failure    Past Surgical History:    Arthoplasty bilateral knee  Arthroplasty right shoulder  Total hip arthroplasty  Coronary stent placement  Implant pacemaker  Orchiectomy scrotal bilateral  Hernia repair  Bilateral hernia    Family History:    Sister: heart disease  Son: myocardial infarction    Social History:  The patient was accompanied to the ED by himself.  Smoking Status: Never Smoker  Smokeless Tobacco: Never Used  Alcohol Use: Negative  Drug Use: Negative  Marital Status:       Review of Systems   Gastrointestinal: Negative for abdominal pain.   Skin: Positive for wound (right elbow skin tear).   Neurological: Positive for " weakness.        No head trauma   All other systems reviewed and are negative.      Physical Exam     Patient Vitals for the past 24 hrs:   BP Temp Temp src Pulse Resp SpO2 Height Weight   02/22/19 1900 171/80 -- -- 63 -- -- -- --   02/22/19 1830 157/71 -- -- 63 -- 100 % -- --   02/22/19 1745 161/66 -- -- 69 -- 99 % -- --   02/22/19 1743 161/66 97.8  F (36.6  C) Oral 68 16 99 % 1.829 m (6') 90.7 kg (200 lb)       Physical Exam  General/Appearance: appears stated age, well-groomed, appears comfortable but fatigued  Eyes: EOMI, no scleral injection, no icterus  ENT: MMM  Neck: supple, nl ROM, no stiffness  Cardiovascular: RRR, nl S1S2, no m/r/g, 2+ pulses in all 4 extremities, cap refill <2sec  Respiratory: CTAB, good air movement throughout, no wheezes/rhonchi/rales, no increased WOB, no retractions  Back: no lesions  GI: abd soft, lower suprapubic distention, nttp,  no HSM, no rebound, no guarding, nl BS  MSK: DENNIS, good tone, no bony abnormality  Skin: warm and well-perfused, purpura with skin tears to R elbow  Neuro: GCS 15, alert and oriented, no gross focal neuro deficits  Psych: interacts appropriately  Heme: no active bleeding    Emergency Department Course     ECG:  ECG taken at 1843, ECG read at 1850  Ventricular-paced rhythm  Abnormal ECG  Rate 60 bpm. FL interval * ms. QRS duration 128 ms. QT/QTc 518/518 ms. P-R-T axes * 204 94.    Laboratory:  Laboratory findings were communicated with the patient who voiced understanding of the findings.    UA with microscopic: blood trace(A), protein albumin 10(A), nitrite positive(A), leukocyte esterase moderate(A), WBC 72(A), RBC 7(A), bacteria many(A), mucous present(A), o/w WNL  Troponin (Collected 1846): 0.018  CBC: WBC 7.8, HGB 10.6(L),   BMP: chloride 111(H), glucose 102(H), calcium 8.2(L) o/w WNL (Creatinine 0.90)    Interventions:  1817 uro-jet 10 ml urethral  1855  ml IV  1907 tylenol 500 mg PO  Rocephin 1 g IV    Emergency Department  Course:    1740 Nursing notes and vitals reviewed.    1745 I performed an exam of the patient as documented above.     1830 The patient provided a urine sample here in the emergency department. This was sent for laboratory testing, findings above.    1846 IV was inserted and blood was drawn for laboratory testing, results above.    1927 Patient rechecked and updated.     1944 I spoke with Dr. Bledsoe of the hospitalist service from [facility] regarding patient's presentation, findings, and plan of care.      Impression & Plan      Medical Decision Making:  Bret Merino is a 89 year old male who presents to the emergency department today for evaluation of 1 month of progressive generalized weakness.  He has no other focal symptoms except for complaints of urinary frequency.  Today he attempted to urinate for us multiple times without any relief.  Bedside ultrasound showed over 1 L urinary retention.  A Morales was placed and significant amounts of urine is drained.  Urinalysis does show what appears to be an obvious UA, markedly different than the UA done when he was here on the 14th for weakness.  We will start him on ceftriaxone.  He is having multiple falls that I suspect are secondary to the weakness and may be contributed to by the UTI.  We will admitted overnight for IV antibiotics and possible social work consult to see if they can help with advanced placement.    Diagnosis:    ICD-10-CM    1. Urinary tract infection without hematuria, site unspecified N39.0      Disposition:   The patient is admitted into the care of Dr. Bledsoe.    Scribe Disclosure:  STANISLAW, Sanjana Enmaorado, am serving as a scribe at 5:51 PM on 2/22/2019 to document services personally performed by Brandi Suárez MD based on my observations and the provider's statements to me.       EMERGENCY DEPARTMENT       Brandi Suárez MD  02/22/19 2022

## 2019-02-22 NOTE — ED NOTES
Bed: ED01  Expected date: 2/22/19  Expected time: 5:28 PM  Means of arrival:   Comments:  195-01M weakness

## 2019-02-22 NOTE — LETTER
19    Demographic Information on Bret Merino:    Bret Merino  Gender: male  : 1925  [address] [place] [id number]  [phone] (home)     To Whom It May Concern.    Please admit to long-term care facility.  Please see attached recent note for the most up-to-date medication list and diagnosis list in our current system.    Please have the rounding physician from [facility] or patient preferred provider see patient next available opportunity.          Ad Brenner MD

## 2019-02-22 NOTE — TELEPHONE ENCOUNTER
Charmaine maier daughter called and said that linus [facility]iffin said they didn't receive a fax please re fax

## 2019-02-23 NOTE — PLAN OF CARE
A&Ox3, disoriented to situation. VSS on RA. Up with assist of 1, Gb and walker. Tele 100% V-paced. Mod carb, low fat and low Na diet. 0200 . Morales in place, draining bloody output. Denies pain. Redness to coccyx, mepilex CDI. Barrier cream to perineal area. Pt turns with encouragement. Denies SOB. On IV lasix. Fall risk. IV SL. Will continue to monitor.

## 2019-02-23 NOTE — PHARMACY-ANTICOAGULATION SERVICE
Clinical Pharmacy - Warfarin Dosing Consult     Pharmacy has been consulted to manage this patient s warfarin therapy.  Indication: Atrial Fibrillation  Therapy Goal: INR 2-3  Warfarin Prior to Admission: Yes  Warfarin PTA Regimen: 2.5 mg po daily   Recent documented change in oral intake/nutrition: Unknown    INR   Date Value Ref Range Status   02/22/2019 2.57 (H) 0.86 - 1.14 Final   01/25/2019 2.3  Final       Recommend warfarin 2.5  mg today.  Pharmacy will monitor Bret Merino daily and order warfarin doses to achieve specified goal.      Please contact pharmacy as soon as possible if the warfarin needs to be held for a procedure or if the warfarin goals change.

## 2019-02-23 NOTE — PHARMACY-ANTICOAGULATION SERVICE
Clinical Pharmacy - Warfarin Dosing Consult     Pharmacy has been consulted to manage this patient s warfarin therapy.  Indication: Atrial Fibrillation  Therapy Goal: INR 2-3  Warfarin Prior to Admission: Yes  Warfarin PTA Regimen: 2.5 mg po daily  Recent documented change in oral intake/nutrition: Unknown    INR   Date Value Ref Range Status   01/25/2019 2.3  Final   09/20/2018 2.8  Final       INR pending.  Pharmacy will monitor Bret Merino daily and order warfarin doses to achieve specified goal.      Please contact pharmacy as soon as possible if the warfarin needs to be held for a procedure or if the warfarin goals change.

## 2019-02-23 NOTE — PHARMACY-ADMISSION MEDICATION HISTORY
Admission medication history interview status for the 2/22/2019  admission is complete. See EPIC admission navigator for prior to admission medications     Medication history source reliability:Good    Actions taken by pharmacist (provider contacted, etc): Interviewed patient who was a poor historian, but then called and verified medications with [facility].      Additional medication history information not noted on PTA med list : Last dose of warfarin was 2/21 PM. He reports taking 4 medications only all at night time and tylenol as needed during the day    Medication reconciliation/reorder completed by provider prior to medication history? No    Time spent in this activity: 15 minutes    Prior to Admission medications    Medication Sig Last Dose Taking? Auth Provider   acetaminophen (TYLENOL) 325 MG tablet Take 650 mg by mouth every 6 hours as needed for mild pain  at prn Yes Unknown, Entered By History   atorvastatin (LIPITOR) 20 MG tablet Take 20 mg by mouth At Bedtime  2/21/2019 at pm Yes Reported, Patient   lisinopril (PRINIVIL/ZESTRIL) 10 MG tablet Take 10 mg by mouth At Bedtime  2/21/2019 at pm Yes Reported, Patient   metoprolol succinate (TOPROL-XL) 25 MG 24 hr tablet Take 25 mg by mouth At Bedtime  2/21/2019 at pm Yes Reported, Patient   warfarin (JANTOVEN) 2.5 MG tablet Take 1 tablet (2.5 mg) by mouth daily As directed  Patient taking differently: Take 2.5 mg by mouth daily 2.5 mg daily 2/21/2019 at pm Yes Ad Brenner MD   ASPIRIN NOT PRESCRIBED (INTENTIONAL) Please choose reason not prescribed, below   Ad Brenner MD Chelsea Goldsmith, PharmD   [phone]

## 2019-02-23 NOTE — PROGRESS NOTES
Called re: blood in urine drained by cook. Hgb relatively stable 10.6 > 9.9. Suspect secondary to BPH.   Check another hgb this afternoon. Place transfusion consent at bedside. Doubt he will need, but would transfuse if hgb < 7.   Ensure good flow through cook.   Urology consult pending.

## 2019-02-23 NOTE — PROGRESS NOTES
RECEIVING UNIT ED HANDOFF REVIEW    ED Nurse Handoff Report was reviewed by: Lion Kelly on February 22, 2019 at 8:36 PM

## 2019-02-23 NOTE — PLAN OF CARE
A&O x 3, [place], forgetful, vss, on RA, c/o of leg cramps , some relief with tylenol,cook draining pink tinged urine, HGB  9.8, orders to transfuse if drops below 7, on iv lasix, good OP. Continues on iv Rocephin,UC pending, seen by Urology,PT recommending TCU , Echo done, tele 100% V paced, discharge pending progress.

## 2019-02-23 NOTE — H&P
[facility]    History and Physical  Hospitalist       Date of Admission:  2/22/2019  Date of Service (when I saw the patient): 02/22/19    ASSESSMENT  Bret Merino is a pleasant 89 year old gentleman with extensive past medical history that is most notable for CAD,     chronic systolic CHF, chronic atrial fibrillation with Bi-V ICD on J[place]oven, hypertension, dyslipidemia and remote history of prostate cancer status post Orchiectomy who presents with progressive weakness and is found to have complex UTI and urinary retention as well as possible acute systolic CHF exacerbation.    PLAN    1) UTI and possible CHF: Mr. Merino tells me he had orchiectomy in [place] remotely. One note in his history in Care Everywhere records a negative PSA in 2011; I can not find any further details of his urologic history. In addition he has had multiple stents placed for CAD and reportedly had EF as low as 20% in the past, and underwent Bi-V ICD placement in 2013. Most recent TTE 10/2018 showed EF much improved to 60%; his device was last interrogated 11/2018 and reportedly reassuring. Now he presents with 4-6 weeks of progressive weakness. I think it is possible this is due to CHF, although he is not hypoxic or dyspneic: he has mild leg edema, mildly elevated BNP, and prominent external jugular vein on physical exam. At the same time, he has new finding on this ED presentation of urinary retention requiring Morales catheter placement to evacuate over a liter of urine with pyuria and urinary frequency, concerning for complex UTI; of possible concern might be recurrent cancer as an alternative cause of his weakness. Last we note he has chronic anemia that seems to be trending lower; there is no evidence thus far for acute GI bleeding but we must monitor for that.    -- Inpatient. IV Ceftriaxone continued as we follow up cultures. Continue Morales catheter for now placed in ED. Urology consulted for further evaluation.     --  "Telemetry. Fall precautions. CXR and TTE ordered. Hold off on further IV fluid for now. If he develops dyspnea or hypoxia, or has any new abnormal findings on CXR, would start empiric IV Lasix 20 mg. Consider Cardiology consultation pending test results.     -- Monitor anemia with CBC (HGB 10 today)        2) CAD: It is difficult at present to ascertain precise details of this history. One note in history in Care Everywhere says \"1996 first stent. approx 2005, stent, 3/2011patent prox Cx stent, 1st diag 75%, LE placed. 100% LAD not treated. EF 25%.   BiVentricular pacer/AICD 2013, EF normalized. Off furosemide 12/14.\"       -- Continue Lisinopril, Toprol, Lipitor    3) Trans-aminitis: Mild, noted at recent ED visit with negative abdominal ultrasound nor with any abdominal pain. He could have hepatic congestion due to CHF.    -- Repeat hepatic panel now    4) Afib: Continue Warfarin. Consider repeat device interrogation.    5) Borderline Diabetes mellitus: By history.       -- ISS insulin ordered; check A1c    Chief Complaint   Weakness    History is obtained from the patient and the ED physician whom I have spoken with    History of Present Illness   Bret Merino is a pleasant 89 year old gentleman who presents with weakness. He says he has had generalized weakness and feels like a \"dish rag\" (he used the same phrase to the ED team earlier) for the past 4-6 weeks. He is not well able to further characterize his maladies. His son (no longer present) reported to the ED that he has been taking care of his father every day at an [facility], in anticipation of trying to get him into a nursing home in the near future; his son reported he has been falling recently, several times over, and indeed he was seen in our ED on 2/14 after a fall; at that time he complained of dizziness and nausea (he denies those symptoms to me at present). Low PO intake has been reported though again he denies that to me. He has consistently denied " "then and on encounters today having any chest pain, dyspnea, leg swelling, cough, fever or chills, or abdominal pain. It was noted on 2/14 that he had mildly elevated pro-BNP, with CXR showing cardiomegaly but without acute pulmonary edema. He had mild trans-aminitis, with negative abdominal ultrasound for any acute pathology. UA was negative for pyuria though it did show hematuria. He felt better and was discharged but returns today for ongoing progression of his weakness, also noting that it seems he may have fallen several days ago as well. Today he also seems to have a new complaint of urinary frequency \"I have to go all the damn time especially because I drink so much water.\" Otherwise however he says \"I don't know what we're doing here\" and he denies any other acute complaints.    In the ED, Blood pressure 171/80, pulse 63, temperature 97.8  F (36.6  C), temperature source Oral, resp. rate 16, height 1.829 m (6'), weight 90.7 kg (200 lb), SpO2 100 %.    CBC showed WBC 8, HGB 10.6 (11.2 on 2/14/2019), . BMP was unremarkable. Troponin was negative. EKG showed a paced rhythm. UA showed WBC 72, RBC 7. He was given a gentle bolus of IV fluid as well as Ceftriaxone.    PHYSICAL EXAM  Blood pressure 171/80, pulse 63, temperature 97.8  F (36.6  C), temperature source Oral, resp. rate 16, height 1.829 m (6'), weight 90.7 kg (200 lb), SpO2 100 %.  Constitutional: Alert and oriented to person, place and time; no apparent distress  HEENT: normocephalic moist mucus membranes  Respiratory: lungs have diffuse crackles to auscultation bilaterally  Cardiovascular: regular S1 S2; prominent right EJ vein  GI: abdomen soft non tender non distended bowel sounds positive  Lymph/Hematologic: pale, no cervical lymphadenopathy  Genitourinary: Morales in place, draining  Skin: no rash, good turgor  Musculoskeletal: mild bilateral LE edema  Neurologic: extra-ocular muscles intact; moves all four extremities  Psychiatric: " appropriate affect, insight and judgment     DVT Prophylaxis: Pneumatic Compression Devices  Code Status: Full Code presumed for now    Disposition: Expected discharge in 2-3 days    Bret Bledsoe MD    Past Medical History    I have reviewed this patient's medical history and updated it with pertinent information if needed.   Past Medical History:   Diagnosis Date     A-fib (H)      CAD (coronary artery disease)      CHF (congestive heart failure) (H)      Dyslipidemia      History of prediabetes      Hypertension      Pneumonia 01/2018     Prostate cancer (H)      Skin cancer        Past Surgical History   I have reviewed this patient's surgical history and updated it with pertinent information if needed.  Past Surgical History:   Procedure Laterality Date     ARTHROPLASTY KNEE BILATERAL       ARTHROPLASTY SHOULDER Right      AS TOTAL HIP ARTHROPLASTY Right      H CORONARY INTERVENTION      stentin for CAD     IMPLANT PACEMAKER       ORCHIECTOMY SCROTAL BILATERAL      treatment of prostate cancer       Prior to Admission Medications   Prior to Admission Medications   Prescriptions Last Dose Informant Patient Reported? Taking?   ASPIRIN NOT PRESCRIBED (INTENTIONAL)  Pharmacy Yes No   Sig: Please choose reason not prescribed, below   acetaminophen (TYLENOL) 325 MG tablet  at prn  Yes Yes   Sig: Take 650 mg by mouth every 6 hours as needed for mild pain   atorvastatin (LIPITOR) 20 MG tablet 2/21/2019 at pm Pharmacy Yes Yes   Sig: Take 20 mg by mouth At Bedtime    lisinopril (PRINIVIL/ZESTRIL) 10 MG tablet 2/21/2019 at pm Pharmacy Yes Yes   Sig: Take 10 mg by mouth At Bedtime    metoprolol succinate (TOPROL-XL) 25 MG 24 hr tablet 2/21/2019 at pm Pharmacy Yes Yes   Sig: Take 25 mg by mouth At Bedtime    warfarin (JANTOVEN) 2.5 MG tablet 2/21/2019 at pm Pharmacy No Yes   Sig: Take 1 tablet (2.5 mg) by mouth daily As directed   Patient taking differently: Take 2.5 mg by mouth daily 2.5 mg daily       Facility-Administered Medications: None     Allergies   Allergies   Allergen Reactions     Aleve [Naproxen]        Social History   I have reviewed this patient's social history and updated it with pertinent information if needed. Bret Merino  reports that  has never smoked. he has never used smokeless tobacco. He reports that he does not drink alcohol or use drugs.    Family History   Family history assessed and, except as above, is non-contributory.    Review of Systems   The 10 point Review of Systems is negative other than noted in the HPI or here.     Primary Care Physician   Ad Brenner    Data   Labs Ordered and Resulted from Time of ED Arrival Up to the Time of Departure from the ED   CBC WITH PLATELETS DIFFERENTIAL - Abnormal; Notable for the following components:       Result Value    RBC Count 3.50 (*)     Hemoglobin 10.6 (*)     Hematocrit 32.5 (*)     All other components within normal limits   BASIC METABOLIC PANEL - Abnormal; Notable for the following components:    Chloride 111 (*)     Glucose 102 (*)     Calcium 8.2 (*)     All other components within normal limits   ROUTINE UA WITH MICROSCOPIC - Abnormal; Notable for the following components:    Blood Urine Trace (*)     Protein Albumin Urine 10 (*)     Nitrite Urine Positive (*)     Leukocyte Esterase Urine Moderate (*)     WBC Urine 72 (*)     RBC Urine 7 (*)     Bacteria Urine Many (*)     Mucous Urine Present (*)     All other components within normal limits   TROPONIN I   HEPATIC PANEL   NT PROBNP INPATIENT   ED TEMP INDWELLING BLADDER CATHETER (SIMPLE)   URINE CULTURE AEROBIC BACTERIAL       Data reviewed today:  I personally reviewed the EKG tracing showing paced rhythm.    No results found for this or any previous visit (from the past 24 hour(s)).

## 2019-02-23 NOTE — CONSULTS
"[facility] Inpatient Consultation Note    Bret Merino MRN# [id number]   Age: 89 year old YOB: 1925     Date of Admission:  2/22/2019    Reason for consult: Urinary retention       Requesting physician: Bret Bledsoe MD                History of Present Illness:   89M admitted 2/22/19 for weakness.  In ED Morales placed and >>1L return.  Urine initially clear but now with hematuria.  Patient reported increased urinary frequency prior to admission, although today denies any difficulty urinating in the past.  He reports a history of prostate cancer and was treated with orchiectomy \"20 years ago, maybe more\" and denies any followup for his cancer since that time.  Denies any surgery or radiation treatments for prostate cancer.  Denies pain.      Urinalysis on admission 72 WBC, 7 RBC, + Nit, + many bacteria.  UCx pending.  On Ceftriaxone per hospitalist.          Past Medical History:     Past Medical History:   Diagnosis Date     A-fib (H)      CAD (coronary artery disease)      CHF (congestive heart failure) (H)      Dyslipidemia      History of prediabetes      Hypertension      Pneumonia 01/2018     Prostate cancer (H)      Skin cancer              Past Surgical History:     Past Surgical History:   Procedure Laterality Date     ARTHROPLASTY KNEE BILATERAL       ARTHROPLASTY SHOULDER Right      AS TOTAL HIP ARTHROPLASTY Right      H CORONARY INTERVENTION      stentin for CAD     IMPLANT PACEMAKER       ORCHIECTOMY SCROTAL BILATERAL      treatment of prostate cancer             Social History:   Smoking Status: Never Smoker  Smokeless Tobacco: Never Used  Alcohol Use: Negative  Drug Use: Negative  Marital Status:              Family History:   Heart disease             Allergies:     Allergies   Allergen Reactions     Aleve [Naproxen]                   Review of Systems:   10 pt ROS obtained and negative except as in HPI     Examination:  /64 (BP Location: Left arm)   Pulse 63   " Temp 98.7  F (37.1  C) (Oral)   Resp 18   Ht 1.829 m (6')   Wt 91.9 kg (202 lb 9.6 oz)   SpO2 97%   BMI 27.48 kg/m    General: Alert and oriented, no distress  HEENT: Face symmetric, mucous membranes moist and pink  Eyes: No scleral icterus  Neck: Symmetric  Chest wall: Symmetric  Respiratory: Breathing unlabored, no audible wheezing  Cardiac: Extremities warm and well perfused, no edema  Abdomen: soft, non tender, non distended; no rebound, guarding or peritoneal signs  Back: No CVA or flank tenderness  : Morales secure, light hematuria, appears old.  Phallus no lesions.  Uncirc.  Empty scrotum.  NANCY: Very hard and multiple nodules bilaterally, cT3a  Extremities: multiple ecchymosis, lynda right elbow  Neuro:Grossly non focal  Pysch: Normal mood and affect       Labs:  Cr 0.79  UA as above    WBC 5.6    Impression: 89 y/o M with urinary retention and positive UA. Most likely cause of retention and hematuria is UTI, but given history of prostate cancer with no follow-up for >20 years and suspicious NANCY, recurrent prostate cancer could also be etiology.    Plan:  1) UTI- agree with treatment per hospitalist, await cultures    2) Urinary retention- continue Morales until UTI treated and until ambulatory    3) Hematuria- consider CT Urogram and cystoscopy as outpatient     4) Prostate cancer- check PSA level    Will follow      Rafa Noel MD  [facility] ([facility])  [phone]

## 2019-02-23 NOTE — PROGRESS NOTES
02/23/19 1200   Quick Adds   Type of Visit Initial PT Evaluation   Living Environment   Lives With alone   Living Arrangements apartment   Home Accessibility no concerns   Transportation Anticipated family or friend will provide   Self-Care   Usual Activity Tolerance moderate   Current Activity Tolerance moderate   Regular Exercise No   Equipment Currently Used at Home walker, rolling   Activity/Exercise/Self-Care Comment Pt reports use of FWW for all ambulation at home   Functional Level Prior   Ambulation 1-->assistive equipment   Transferring 1-->assistive equipment   Fall history within last six months yes   Number of times patient has fallen within last six months 1   Which of the above functional risks had a recent onset or change? ambulation;transferring;fall history   Prior Functional Level Comment Pt reports that he lives in an independent living apartment, trying to transition to assisted living. Pts son comes every morning to help with dressing and to check with patient.    General Information   Onset of Illness/Injury or Date of Surgery - Date 02/22/19   Referring Physician Dr. Bledsoe   Patient/Family Goals Statement To go home   Pertinent History of Current Problem (include personal factors and/or comorbidities that impact the POC) Pt is a 89 year old male admitted for weakness and UTI.   Precautions/Limitations fall precautions   Cognitive Status Examination   Orientation orientation to person, place and time   Level of Consciousness alert   Pain Assessment   Patient Currently in Pain No   Range of Motion (ROM)   ROM Quick Adds No deficits were identified   Strength   Strength Comments Gross LE strength 4/5 bilaterally   Bed Mobility   Bed Mobility Comments Supine to sit SBA   Transfer Skills   Transfer Comments Sit to/from stand min assist   Gait   Gait Comments 15' FWW CGA, decreased lore and step length   Balance   Balance Comments Good in sitting, fair in standing   General Therapy  "Interventions   Planned Therapy Interventions balance training;gait training;strengthening;transfer training;progressive activity/exercise;home program guidelines   Clinical Impression   Criteria for Skilled Therapeutic Intervention yes, treatment indicated   PT Diagnosis Impaired ambulation   Influenced by the following impairments Decreased strength, decreased endurance, decreased balance   Functional limitations due to impairments Difficulty with bed mobility, transfers, ambulation   Clinical Presentation Stable/Uncomplicated   Clinical Presentation Rationale VSS, pain controlled   Clinical Decision Making (Complexity) Low complexity   Therapy Frequency` 5 times/week   Predicted Duration of Therapy Intervention (days/wks) 1 week   Anticipated Equipment Needs at Discharge walker   Anticipated Discharge Disposition Transitional Care Facility   Risk & Benefits of therapy have been explained Yes   Patient, Family & other staff in agreement with plan of care Yes   [facility] NeedFeed TM \"6 Clicks\"   2016, Trustees of [facility], under license to [employer].  All rights reserved.   6 Clicks Short Forms Basic Mobility Inpatient Short Form   [facility] [employer]  \"6 Clicks\" V.2 Basic Mobility Inpatient Short Form   1. Turning from your back to your side while in a flat bed without using bedrails? 3 - A Little   2. Moving from lying on your back to sitting on the side of a flat bed without using bedrails? 3 - A Little   3. Moving to and from a bed to a chair (including a wheelchair)? 3 - A Little   4. Standing up from a chair using your arms (e.g., wheelchair, or bedside chair)? 3 - A Little   5. To walk in hospital room? 3 - A Little   6. Climbing 3-5 steps with a railing? 3 - A Little   Basic Mobility Raw Score (Score out of 24.Lower scores equate to lower levels of function) 18   Total Evaluation Time   Total Evaluation Time (Minutes) 12     "

## 2019-02-23 NOTE — ED NOTES
"[facility]  ED Nurse Handoff Report    ED Chief complaint: Extremity Weakness (Patient has increased weakness over last month.  Frequent falls.  Son reported to EMS patient urinating frequently.  Residing in assisited living but son taking care of patient during day.  Has been unable to get him into a nursing home yet..)      ED Diagnosis:   Final diagnoses:   Urinary tract infection without hematuria, site unspecified       Code Status: MD to address    Allergies:   Allergies   Allergen Reactions     Aleve [Naproxen]        Activity level - Baseline/Home:  Stand with Assist    Activity Level - Current:   Unable to Assess     Needed?: No  Patient hard of hearing  Isolation: No  Infection: Not Applicable  Bariatric?: Yes    Vital Signs:   Vitals:    02/22/19 1743 02/22/19 1745 02/22/19 1830 02/22/19 1900   BP: 161/66 161/66 157/71 171/80   Pulse: 68 69 63 63   Resp: 16      Temp: 97.8  F (36.6  C)      TempSrc: Oral      SpO2: 99% 99% 100%    Weight: 90.7 kg (200 lb)      Height: 1.829 m (6')          Cardiac Rhythm: ,        Pain level: 0-10 Pain Scale: 0    Is this patient confused?: No   Does this patient have a guardian?  No         If yes, is there guardianship documents in the Epic \"Code/ACP\" activity?  N/A         Guardian Notified?  N/A  [place] - Suicide Severity Rating Scale Completed?  Yes  If yes, what color did the patient score?  White    Patient Report: Initial Complaint:  Generalized weakness  Focused Assessment:  Increased weakness over last month.  Son has been taking care of him at his assisted living (resides at [facility]).  Patient has increased weakness, last fall 1 week ago.  Frequent urination & son unable to care for him any longer.  Patient felt urge to urinate in ED, however was unable to urinate at all.  Bladder scan completed which revealed >999ml of urine in bladder.  16 coude inserted without difficulty with good urine return back, no blood or blood clots " noted.  Patient initially irritable following cook catheter, complaints of neck pain & could not find position of comfort.  500 mg of Tylenol given with good pain relief.      Per EMS report son has been trying to find placement in nursing home which can provide more cares than the assisted living.  Has been unsuccessful finding an available bed.  Tests Performed:  Labs, UA  Abnormal Results:    Labs Ordered and Resulted from Time of ED Arrival Up to the Time of Departure from the ED   CBC WITH PLATELETS DIFFERENTIAL - Abnormal; Notable for the following components:       Result Value    RBC Count 3.50 (*)     Hemoglobin 10.6 (*)     Hematocrit 32.5 (*)     All other components within normal limits   BASIC METABOLIC PANEL - Abnormal; Notable for the following components:    Chloride 111 (*)     Glucose 102 (*)     Calcium 8.2 (*)     All other components within normal limits   ROUTINE UA WITH MICROSCOPIC - Abnormal; Notable for the following components:    Blood Urine Trace (*)     Protein Albumin Urine 10 (*)     Nitrite Urine Positive (*)     Leukocyte Esterase Urine Moderate (*)     WBC Urine 72 (*)     RBC Urine 7 (*)     Bacteria Urine Many (*)     Mucous Urine Present (*)     All other components within normal limits   TROPONIN I   HEPATIC PANEL   ED TEMP INDWELLING BLADDER CATHETER (SIMPLE)   URINE CULTURE AEROBIC BACTERIAL       Treatments provided:  IV insertion.  NS bolus.  Tylenol 500 mg.  Ceftriaxone.     Family Comments:  None present    OBS brochure/video discussed/provided to patient/family: Yes              Name of person given brochure if not patient:               Relationship to patient:     ED Medications:   Medications   cefTRIAXone (ROCEPHIN) 1 g vial to attach to  mL bag for ADULTS or NS 50 mL bag for PEDS (not administered)   0.9% sodium chloride BOLUS (500 mLs Intravenous New Bag 2/22/19 1855)   lidocaine 2 % (URO-JET) jelly 10 mL (10 mLs Urethral Given 2/22/19 1817)   acetaminophen  (TYLENOL) tablet 500 mg (500 mg Oral Given 2/22/19 4108)       Drips infusing?:  No    For the majority of the shift this patient was Green.   Interventions performed were monitor, assess.    Severe Sepsis OR Septic Shock Diagnosis Present: No    To be done/followed up on inpatient unit:      ED NURSE PHONE NUMBER: *[phone]

## 2019-02-23 NOTE — PROGRESS NOTES
"[facility]    Medicine Progress Note - Hospitalist Service       Date of Admission:  2/22/2019  Assessment & Plan   Bret Merino is a pleasant 89 year old gentleman with extensive past medical history that is most notable for CAD,     chronic systolic CHF, chronic atrial fibrillation with Bi-V ICD on Jantoven, hypertension, dyslipidemia and remote history of prostate cancer status post Orchiectomy who presents with progressive weakness.     Chronic progressive weakness - x 4-6 weeks, No acute metabolic disturbance.   UTI with urinary retention - without systemic signs of infection. Afebrile, hemodynamically stable.   Mr. Merino is s/p orchiectomy in [place] remotely. One note in his history in Care Everywhere records a negative PSA in 2011; No further details of his urologic history.  He had urinary retention, s/p Morales catheter placement to evacuate > 1 L of urine with pyuria and urinary frequency,   Concern for recurrent cancer as an alternative cause of his weakness.   CAD, Possible decompensation of CHF, h/o systolic dysfunction  HTN, HLD   Atrial Fibrillation   S/p Bi-V ICD placement in 2013   It is difficult at present to ascertain precise details of this history. One note in history in Care Everywhere says \"1996 first stent. approx 2005, stent, 3/2011patent prox Cx stent, 1st diag 75%, LE placed. 100% LAD not treated. EF 25%, EF 60% in 10/2018  His device was last interrogated 11/2018 and reportedly reassuring. Question decompensated CHF. He is not hypoxic or dyspneic: he has mild leg edema, mildly elevated BNP (2,107), and prominent external jugular vein on physical exam. CXR, personally reviewed on 02/23/19, shows mild interstitial edema. Troponin negative at admission and sx chronic.   Echo on 02/23/19 shows EF of 55%, RV nl, Mild pulmonary HTN. Mild Aortic stenosis. Reduced respiratory collapse of IVC, nl size.     Chronic normocytic anemia No evidence of acute GI bleeding. Per review of " Care Everywhere baseline hgb 10-11. Hgb 11.2 on 2/14 > 10.6 on admission.     Recent Labs   Lab 02/23/19  0744 02/22/19  1846   HGB 9.9* 10.6*       PLAN  - Ceftriaxone continued as we follow up cultures. Continue Cook catheter for now placed in ED. Urology consulted for further evaluation.   - Telemetry. Fall precautions, PT/OT evaluation   - Continue PTA lisinopril, atorvastatin, metoprolol   - Lasix 20 mg IV BID started on admission.   - Daily wts, BMP, strict I and Os.   - Check TSH.   - Follow daily hgb x 3 days.   - Continue coumadin as dose per pharmacy  Recent Labs   Lab 02/23/19  0744 02/22/19  2150 02/22/19  1846   INR 2.43* 2.57* Canceled, Test credited        Hematuria, Urinary Retention  - Appreciate urology consult  - Discharge with cook in place with Urology follow up with cystoscopy and CT urogram as outpatient     H/o prostate cancer  - check PSA    DM, A1c 6.7 @ admission, new diagnosis  Recent Labs   Lab 02/23/19  0744 02/23/19  0149 02/22/19  2143 02/22/19  1846   *  --   --  102*   BGM  --  118* 103*  --    - At his age, would not add new medication, control with diet.   - Follow with PRN sliding scale insulin in the hospital.         Diet: Combination Diet 6005-5945 Calories: Moderate Consistent CHO (4-6 CHO units/meal); Low Saturated Fat Na <2400mg Diet, No Caffeine Diet    DVT Prophylaxis: Warfarin  Cook Catheter: in place, indication: Retention  Code Status: Full Code      Disposition Plan   Expected discharge: 2 - 3 days, recommended to transitional care unit once Once we feel we have adequately diuresed, have echo results, urology consult and PT evaluation for placement. .  Entered: Criselda Lance MD 02/23/2019, 9:02 AM       The patient's care was discussed with the Bedside Nurse.    Criselda Lance MD  Hospitalist Service  [facility]    ______________________________________________________________________    Interval History   Patient states he is feeling  better, stronger. He is agr    Data reviewed today: I reviewed all medications, new labs and imaging results over the last 24 hours. I personally reviewed no images or EKG's today.    Physical Exam   Vital Signs: Temp: 98.7  F (37.1  C) Temp src: Oral BP: 144/64 Pulse: 63 Heart Rate: 62 Resp: 18 SpO2: 97 % O2 Device: None (Room air)    Weight: 202 lbs 9.64 oz  Constitutional:  NAD,   Neuropsyche:  alert and oriented, answers questions appropriately.   Respiratory:  Breathing comfortably, good air exchange, no wheezes, crackles in the bases   Cardiovascular:  Irregular rate and rhythm, 1+ edema.  GI:  soft, NT/ND, BS normal  Skin/Integumen: No acute rash, bruising or sign of bleeding.       Data   Recent Labs   Lab 02/23/19  1543 02/23/19  0744 02/22/19  2150 02/22/19  1846   WBC  --  5.8  --  7.8   HGB 9.8* 9.9*  --  10.6*   MCV  --  93  --  93   PLT  --  191  --  214   INR  --  2.43* 2.57* Canceled, Test credited   NA  --  142  --  141   POTASSIUM  --  4.2  --  4.4   CHLORIDE  --  111*  --  111*   CO2  --  26  --  24   BUN  --  22  --  26   CR  --  0.79  --  0.90   ANIONGAP  --  5  --  6   BRIE  --  8.3*  --  8.2*   GLC  --  110*  --  102*   ALBUMIN  --  2.6*  --  3.1*   PROTTOTAL  --  5.9*  --  6.5*   BILITOTAL  --  1.2  --  1.0   ALKPHOS  --  150  --  157*   ALT  --  26  --  32   AST  --  27  --  26   TROPI  --   --   --  0.018     Recent Results (from the past 24 hour(s))   XR Chest Port 1 View    Narrative    CHEST ONE VIEW PORTABLE   2/22/2019 9:35 PM     HISTORY: Dyspnea    COMPARISON: 2/14/2019      Impression    IMPRESSION: Cardiomegaly, implantable cardioverter defibrillator in  the heart. Increased interstitial markings in both lungs suggest  pulmonary edema, new since previous exam.    TERRANCE DEGROOT MD     Medications     Warfarin Therapy Reminder         atorvastatin  20 mg Oral At Bedtime     cefTRIAXone  2 g Intravenous Q24H     furosemide  20 mg Intravenous BID     lisinopril  10 mg Oral At Bedtime      metoprolol succinate ER  25 mg Oral At Bedtime     sodium chloride (PF)  3 mL Intracatheter Q8H

## 2019-02-23 NOTE — PLAN OF CARE
Discharge Planner PT   Patient plan for discharge: None stated  Current status: Pt is a 89 year old male admitted for generalized weakness and UTI. At baseline, pt lives alone in an independent living apartment, walks to dining room for meals and has assist with bathing. Pt reports that his son checks on him daily and assists with dressing. Pt reports that he uses a FWW for household and community ambulation.   This date, patient requires min assist for bed mobility, good sitting balance at EOB. Pt performs sit to/from stand with min assist. Pt ambulates 60' with FWW and CGA.  Barriers to return to prior living situation: Lives alone, fall risk, fall history, decreased activity tolerance, generalized weakness.  Recommendations for discharge: TCU  Rationale for recommendations: Pt will benefit from daily continued therapy to address impairments and increase mobility and functional independence prior to returning home.        Entered by: Yamila Alfredo 02/23/2019 12:27 PM

## 2019-02-23 NOTE — PLAN OF CARE
Alert, disoriented to situation only.  VSS on RA.  Denies pain.  Tele in place, 100% V paced.  Morales in place, blood output.  Mod carb diet.  Continue to monitor.

## 2019-02-24 NOTE — PROGRESS NOTES
UROLOGY    Pt denies pain.  Likely going to TCU post discharge. No issues with catheter.    AVSS  UOP 2000    Cr 0.87  Hgb 11.0    UCx 2/22 pending    NAD  Resp nonlabored  Abd soft  Morales secure, urine clear    Assessment: 89M with urinary retention and positive UA, prostate cancer.     Plan:  1) UTI- agree with treatment per hospitalist, await cultures     2) Urinary retention- continue Morales until UTI treated and until ambulatory     3) Hematuria- consider CT Urogram and cystoscopy as outpatient; can f/u in clinic in 1-2 weeks     4) Prostate cancer- PSA pending           Rafa Noel MD  [facility]  [phone]      15 min total time, >50% care coordination.

## 2019-02-24 NOTE — PLAN OF CARE
Patient on iv lasix, pink tinged urine in cook bag, iv saline lock, blood sugar is 104, some tears and scabs on skin, hemoglobin at 9.8 after transfusion, patient forgetful.

## 2019-02-24 NOTE — PLAN OF CARE
A&Ox3, disoriented to situation. Slightly [place]. VSS on RA. Up with assist of 1, Gb and walker. Tele 100% V-paced. Mod carb, low fat and low Na diet, no caffeine diet. 0200 . Morales in place, draining pink tinged output. Denies pain. Redness to coccyx, mepilex CDI. Barrier cream to perineal area. Multiple scabs. Pt turns with encouragement. Denies SOB. On IV lasix. Fall risk. Discharge pending progress 2-3 days. PT consult pending. Urology following. IV SL. Will continue to monitor.

## 2019-02-24 NOTE — PROGRESS NOTES
Care Transition Initial Assessment - SW     Spoke with: DaughterCharmaine  Active Problems:    UTI (urinary tract infection)       DATA  Lives With: alone   Living Arrangements: apartment  Quality of Family Relationships: involved     Identified issues/concerns regarding health management: Social work consult noted for discharge planning. Patient is a 89 year old male anticipated to be ready for discharge in 1 day. PT recommends a TCU stay. Patient has [employer] Medicare Advantage insurance.   Spoke with daughterCharmaine who is patient's HCA. She reports patient was living alone and can no longer manage to live independently in his apartment. She reports she has already been in contact with [facility] and rehab regarding long term care placement for patient. She would like patient to go there. Noted they are in the [employer] network and patient may have some coverage initially. Charmaine states she has already completed and faxed a Medical Assistance application to [facility] for LTC. Transport is requested at discharge. Sent a [facility] referral to [facility] and left a message on their admissions line.         Quality of Family Relationships: involved  ASSESSMENT  Cognitive Status:  Disoriented to situation per RN note  Concerns to be addressed: SW will follow to coordinate the discharge.   PLAN  Financial costs for the patient includes: Cost of transport reviewed. Noted patient will receive a bill for this, despite MA pending status.  Patient/family is agreeable to the plan?  Yes  Patient/family Goals and Preferences: TCU/LTC    Addendum  Kaylee at [facility] reports they have started the authorization process.

## 2019-02-24 NOTE — PROGRESS NOTES
[facility]    Medicine Progress Note - Hospitalist Service       Date of Admission:  2/22/2019  Assessment & Plan   Bret Merino is a pleasant 89 year old gentleman with extensive past medical history that is most notable for CAD,     chronic systolic CHF, chronic atrial fibrillation with Bi-V ICD on Jantoven, hypertension, dyslipidemia and remote history of prostate cancer status post Orchiectomy who presents with progressive weakness.     Chronic progressive weakness - x 4-6 weeks, No acute metabolic disturbance. Suspect secondary to a combination of UTI and decompensated heart failure with fall with progressive anemia    - PT recommending TCU. Discussed with patient on 02/23 and 2/24/19. He is agreeable to having more assistance but is working with daughter to find AL rather than TCU. He did not want me to call his daughter.   - Treatment of UTI and CHF as below.     UTI with urinary retention  Hematuria post cathater placement.    - without systemic signs of infection. Afebrile, hemodynamically stable.   Mr. Merino is s/p orchiectomy in [place] remotely. One note in his history in Care Everywhere records a negative PSA in 2011; No further details of his urologic history.  He had urinary retention, s/p Cook catheter placement to evacuate > 1 L of urine with pyuria and urinary frequency,   Concern for recurrent cancer as an alternative cause of his weakness.    - Continue ceftriaxone   - UCx still pending on 02/24/19.   - Urology consulted. > Continue cook catheter at discharge with with cystoscopy and CT urogram as outpatient   - H/o prostate cancer > PSA pending on 02/24/19.   - Will hold coumadin today 02/24/19 to allow hematuria to resolve.      CAD,   Suspect decompensation of CHF, h/o systolic dysfunction  HTN, HLD   Atrial Fibrillation   S/p Bi-V ICD placement in 2013   It is difficult at present to ascertain precise details of medical history. One note in history in Care Everywhere says  "\"1996 first stent. approx 2005, stent, 3/2011patent prox Cx stent, 1st diag 75%, LE placed. 100% LAD not treated. EF 25%, EF 60% in 10/2018 His device was last interrogated 11/2018 and reportedly reassuring.   On admission he had mild leg edema, elevated BNP (2,107), and prominent external jugular vein on physical exam. CXR showed mild interstitial edema. Troponin negative at admission and sx chronic.   Echo on 02/23/19 EF of 55%, RV nl, Mild pulmonary HTN. Mild Aortic stenosis. Reduced respiratory collapse of IVC, nl size.  TSH nl @ 0.87.     - Continue PTA lisinopril, atorvastatin, metoprolol   - Diuresing with IV lasix.   - On 02/24/19, wt actually up (? Accuracy) but net negative 5 L, with increase in hgb, suggesting effective diuresis. Patient states feels stronger and breathing better, swelling down.   - Lasix 20 mg IV BID started on admission > change to lasix 40 mg daily, first dose 1600 on 02/24/19.   - Daily wts, BMP, strict I and Os.     Chronic normocytic anemia No evidence of acute GI bleeding. Per review of Care Everywhere baseline hgb 10-11. Hgb 11.2 on 2/14 > 10.6 on admission.     - Appears decrease in hgb may have been dilutional from decompensated CHF.     Recent Labs   Lab 02/24/19  0813 02/23/19  1543 02/23/19  0744 02/22/19  1846   HGB 11.0* 9.8* 9.9* 10.6*         Anticoagulation from Afib     - Continue coumadin as dose per pharmacy  - Hematuria mild, no clots (no drop in hgb, and likely related to trauma of catheterization), resolving.   - HOLD coumadin today 02/24/19, to allow INR to drift down and hematuria to resolve more quickly.     Recent Labs   Lab 02/24/19  0813 02/23/19  0744 02/22/19  2150 02/22/19  1846   INR 2.54* 2.43* 2.57* Canceled, Test credited         DM, A1c 6.7 @ admission, new diagnosis  Recent Labs   Lab 02/24/19  0813 02/24/19  0746 02/24/19  0202 02/23/19  2149 02/23/19  1832 02/23/19  1203 02/23/19  0848 02/23/19  0744  02/22/19  1846   *  --   --   --   --   " --   --  110*  --  102*   BGM  --  123* 109* 104* 163* 119* 108*  --    < >  --     < > = values in this interval not displayed.     - At his age with A1c < 7, would not add new medication, control with diet.   - Follow with PRN sliding scale insulin in the hospital.         Diet: Combination Diet 1216-6621 Calories: Moderate Consistent CHO (4-6 CHO units/meal); Low Saturated Fat Na <2400mg Diet, No Caffeine Diet    DVT Prophylaxis: Warfarin  Cook Catheter: in place, indication: Retention  Code Status: Full Code      Disposition Plan   Expected discharge: Tomorrow, recommended to transitional care unit once once culture is back and we see how he does with transition to PO lasix, and ensure hematuria resolving.   Entered: Criselda Lance MD 02/24/2019, 10:27 AM       The patient's care was discussed with the Bedside Nurse.    Criselda Lance MD  Hospitalist Service  [facility]    ______________________________________________________________________    Interval History   Feels better, stronger. Breathing better. Does not feel ready to leave the hospital. Hematuria resolving, old blood in urine.     Data reviewed today: I reviewed all medications, new labs and imaging results over the last 24 hours. I personally reviewed the EKG tracing showing paced rhythm.    Physical Exam   Vital Signs: Temp: 97.5  F (36.4  C) Temp src: Oral BP: 111/41   Heart Rate: 60 Resp: 16 SpO2: 97 % O2 Device: None (Room air)    Weight: 206 lbs 5.61 oz  Constitutional:  NAD,   Neuropsyche:  alert and oriented, answers questions appropriately.    Respiratory:  Breathing comfortably, good air exchange, no wheezes, minimal crackles localized to bases.   Cardiovascular:  Regular rate and rhythm, trace edema.  GI:  soft, NT/ND, BS normal, brown urine in cook.   Skin/Integumen: No acute rash, bruising or sign of bleeding.       Data   Recent Labs   Lab 02/24/19  0813 02/23/19  1543 02/23/19  0744 02/22/19  2150 02/22/19  1845    WBC  --   --  5.8  --  7.8   HGB 11.0* 9.8* 9.9*  --  10.6*   MCV  --   --  93  --  93   PLT  --   --  191  --  214   INR 2.54*  --  2.43* 2.57* Canceled, Test credited     --  142  --  141   POTASSIUM 3.8  --  4.2  --  4.4   CHLORIDE 106  --  111*  --  111*   CO2 23  --  26  --  24   BUN 23  --  22  --  26   CR 0.87  --  0.79  --  0.90   ANIONGAP 8  --  5  --  6   BRIE 8.3*  --  8.3*  --  8.2*   *  --  110*  --  102*   ALBUMIN  --   --  2.6*  --  3.1*   PROTTOTAL  --   --  5.9*  --  6.5*   BILITOTAL  --   --  1.2  --  1.0   ALKPHOS  --   --  150  --  157*   ALT  --   --  26  --  32   AST  --   --  27  --  26   TROPI  --   --   --   --  0.018     No results found for this or any previous visit (from the past 24 hour(s)).  Medications     Warfarin Therapy Reminder         atorvastatin  20 mg Oral At Bedtime     cefTRIAXone  2 g Intravenous Q24H     furosemide  40 mg Oral QAM     lisinopril  10 mg Oral At Bedtime     metoprolol succinate ER  25 mg Oral At Bedtime     sodium chloride (PF)  3 mL Intracatheter Q8H     warfarin  2.5 mg Oral ONCE at 18:00

## 2019-02-24 NOTE — PLAN OF CARE
A&O x 4, forgetful and [place],up with assist of one, walker and ELICEO vss, on RA, given tylenol x 1 for  comfort, cook still draining bloody urine, Hgb stable, on po lasix, good urine OP, continues  on IV abx, Urology following, BGMs 123 , [id number], good appetite, multiple bruises and scabs all over,refused to work with PT, possible discharge to TCU tomorrow.

## 2019-02-25 NOTE — PROGRESS NOTES
SW:  D:  [facility] has clinically accepted patient and is awaiting authorization for SNF benefits from patient's [employer] insurance.  Dr Lance is planning on discharge tomorrow.  She has updated daughter Charmaine.    Update at 1330  [facility] has received [facility] transport set for Tuesday at 1000.  Contacted daughter Charmaine Flores, who also is patient's primary [facility]CA, and notified her of the discharge time.   Have informed patient and [facility] admissions.

## 2019-02-25 NOTE — PROGRESS NOTES
[facility]    Medicine Progress Note - Hospitalist Service       Date of Admission:  2/22/2019  Assessment & Plan   Bret Merino is a pleasant 89 year old gentleman with extensive past medical history that is most notable for CAD,     chronic systolic CHF, chronic atrial fibrillation with Bi-V ICD on Jantoven, hypertension, dyslipidemia and remote history of prostate cancer status post Orchiectomy who presents with progressive weakness.     Chronic progressive weakness - x 4-6 weeks, No acute metabolic disturbance. Suspect secondary to a combination of UTI and decompensated heart failure with fall with progressive anemia, and prostate cancer.     - PT recommending TCU. Discussed with patient on 02/23 and 2/24/19. He is agreeable to having more assistance but is working with daughter to find AL rather than TCU. He did not want me to call his daughter.   - Treatment of UTI and CHF as below.     Prostate Cancer - Mr. Merino is s/p orchiectomy in [place] remotely. One note in his history in Care Everywhere records a negative PSA in 2011; No further details of his urologic history. PSA 59  - Appreciate urology input.   - CT chest / abdomen / pelvis with CT urogram on 02/25/19, ordered by Urology  - outpatient f/u with Dr. Liz in 1-2 weeks.     UTI with urinary retention  Hematuria post cathater placement.   - without systemic signs of infection. Afebrile, hemodynamically stable. s/p Cook catheter placement to evacuate > 1 L of urine with pyuria and urinary frequency,     - Ceftriaxone changed to PO amoxicillin to complete 7 day course on 02/25/19.   - UCx grew enterococcus sensitive to all abx tested.   - Urology consulted. > Continue cook catheter at discharge with with cystoscopy at follow up  - CT urogram ordered for today, 02/25/19.  - May resume coumadin on 02/25/19. Discussed with Urology.        CAD,   Suspect decompensation of CHF, h/o systolic dysfunction  HTN, HLD   Atrial  "Fibrillation   S/p Bi-V ICD placement in 2013   It is difficult at present to ascertain precise details of medical history. One note in history in Care Everywhere says \"1996 first stent. approx 2005, stent, 3/2011patent prox Cx stent, 1st diag 75%, LE placed. 100% LAD not treated. EF 25%, EF 60% in 10/2018 His device was last interrogated 11/2018 and reportedly reassuring.   On admission he had mild leg edema, elevated BNP (2,107), and prominent external jugular vein on physical exam. CXR showed mild interstitial edema. Troponin negative at admission and sx chronic.   Echo on 02/23/19 EF of 55%, RV nl, Mild pulmonary HTN. Mild Aortic stenosis. Reduced respiratory collapse of IVC, nl size.  TSH nl @ 0.87.     - Continue PTA lisinopril, atorvastatin, metoprolol   - Lasix 20 mg IV BID started on admission > change to lasix 40 mg daily, first dose 1600 on 02/24/19.   - 90.7 > 94 kg on 02/25/19 (? Accuracy, as hgb up, swelling down and net output is neg 6.6 L    Chronic normocytic anemia No evidence of acute GI bleeding. Per review of Care Everywhere baseline hgb 10-11. Hgb 11.2 on 2/14 > 10.6 on admission.   - Appears decrease in hgb may have been dilutional from decompensated CHF.     Recent Labs   Lab 02/24/19  0813 02/23/19  1543 02/23/19  0744 02/22/19  1846   HGB 11.0* 9.8* 9.9* 10.6*         Anticoagulation from Afib     - Continue coumadin as dose per pharmacy  - Hematuria mild, no clots (no drop in hgb, and likely related to trauma of catheterization), resolving.   - Urine nita on 02/25/19, resume coumadin. Discussed with urology.     Recent Labs   Lab 02/25/19  0751 02/24/19  0813 02/23/19  0744 02/22/19  2150 02/22/19  1846   INR 2.43* 2.54* 2.43* 2.57* Canceled, Test credited         DM, A1c 6.7 @ admission, new diagnosis  Recent Labs   Lab 02/25/19  1201 02/25/19  0757 02/25/19  0751 02/25/19  0229 02/24/19  2110 02/24/19  1637 02/24/19  1153 02/24/19  0813  02/23/19  0744  02/22/19  1846   GLC  --   --  " 126*  --   --   --   --  157*  --  110*  --  102*   * 122*  --  101* 109* 114* 111*  --    < >  --    < >  --     < > = values in this interval not displayed.     - At his age with A1c < 7, would not add new medication, control with diet.   - Follow with PRN sliding scale insulin in the hospital.         Diet: Combination Diet [id number] Calories: Moderate Consistent CHO (4-6 CHO units/meal); Low Saturated Fat Na <2400mg Diet, No Caffeine Diet    DVT Prophylaxis: Warfarin  Morales Catheter: in place, indication: Retention  Code Status: Full Code      Disposition Plan   Expected discharge: Tomorrow, recommended to transitional care unit once once culture is back and we see how he does with transition to PO lasix, and ensure hematuria resolving.   Entered: Criselda Lance MD 02/25/2019, 12:36 PM       The patient's care was discussed with the Social work, urology, daughter. .    Time Spent on this Encounter   I spent 35 minutes on the unit/floor managing the care of Bret Merino. Over 50% of my time was spent counseling the patient and/or coordinating care regarding services listed in this note.      Criselda Lance MD  Hospitalist Service  [facility]    ______________________________________________________________________    Interval History    Notably he had some mild disorientation this morning that resolved with reorientation.    Discussed his PSA elevation, UTI and plan. Patient is understanding and in agreement.   No SOB, N/V, pain. Agrees with plan.     Data reviewed today: I reviewed all medications, new labs and imaging results over the last 24 hours. I personally reviewed the EKG tracing showing paced rhythm.    Physical Exam   Vital Signs: Temp: 98.7  F (37.1  C) Temp src: Oral BP: 147/59 Pulse: 60 Heart Rate: 60 Resp: 18 SpO2: 96 % O2 Device: None (Room air)    Weight: 207 lbs 3.72 oz  Constitutional:  NAD,   Neuropsyche:  alert and oriented, answers questions appropriately.     Respiratory:  Breathing comfortably, good air exchange, no wheezes, minimal crackles localized to bases.   Cardiovascular:  Regular rate and rhythm, trace edema.  GI:  soft, NT/ND, BS normal, brown urine in cook.   Skin/Integumen: No acute rash, bruising or sign of bleeding.       Data   Recent Labs   Lab 02/25/19  0751 02/24/19  0813 02/23/19  1543 02/23/19  0744  02/22/19  1846   WBC  --   --   --  5.8  --  7.8   HGB  --  11.0* 9.8* 9.9*  --  10.6*   MCV  --   --   --  93  --  93   PLT  --   --   --  191  --  214   INR 2.43* 2.54*  --  2.43*   < > Canceled, Test credited    137  --  142  --  141   POTASSIUM 4.0 3.8  --  4.2  --  4.4   CHLORIDE 107 106  --  111*  --  111*   CO2 25 23  --  26  --  24   BUN 24 23  --  22  --  26   CR 0.80 0.87  --  0.79  --  0.90   ANIONGAP 6 8  --  5  --  6   BRIE 8.2* 8.3*  --  8.3*  --  8.2*   * 157*  --  110*  --  102*   ALBUMIN  --   --   --  2.6*  --  3.1*   PROTTOTAL  --   --   --  5.9*  --  6.5*   BILITOTAL  --   --   --  1.2  --  1.0   ALKPHOS  --   --   --  150  --  157*   ALT  --   --   --  26  --  32   AST  --   --   --  27  --  26   TROPI  --   --   --   --   --  0.018    < > = values in this interval not displayed.     No results found for this or any previous visit (from the past 24 hour(s)).  Medications     Warfarin Therapy Reminder         amoxicillin  875 mg Oral Q12H SAMIR     atorvastatin  20 mg Oral At Bedtime     furosemide  40 mg Oral QAM     lisinopril  10 mg Oral At Bedtime     metoprolol succinate ER  25 mg Oral At Bedtime     sodium chloride (PF)  3 mL Intracatheter Q8H

## 2019-02-25 NOTE — PROGRESS NOTES
[facility]    Urology Progress Note     Assessment & Plan   Bret Merino is a 89 year old male who was admitted on 2/22/2019. Urinary retention and positive UA, recurrent prostate cancer.    Plan:   -PSA resulted at 59.6-- will further evaluate for metastatic dz with CT chest/abd/pelvis   -If concerning for metastatic dz, will ask medical oncology to see patient while in house   -Given the above, will move forward with CT urogram as well for hematuria evaluation   -Abx per hospital team, UCx with >100K enterococcus   -Will coordinate outpatient follow up with Dr. Noel in 1-2wks for cystoscopy  -Continue cook until more ambulatory, likely 1 week then voiding trial at TCU or our office     Binta Sheldon PA-C  [facility]   [email or web address]  Text Page (7:30am to 4:30pm)  **I will be unavailable after noon today, please page Marybeth Vázquez PA-C at [phone] with questions/concerns.     Interval History   No acute events   Denies any cook discomfort-- urine light nita   Minimal ambulation     AVSS  UCx with >100K Enterococcus   PSA 59.6    Physical Exam   Temp: 98.7  F (37.1  C) Temp src: Oral BP: 147/59 Pulse: 60 Heart Rate: 60 Resp: 18 SpO2: 96 % O2 Device: None (Room air)    Vitals:    02/23/19 0612 02/24/19 0641 02/25/19 0401   Weight: 91.9 kg (202 lb 9.6 oz) 93.6 kg (206 lb 5.6 oz) 94 kg (207 lb 3.7 oz)     Vital Signs with Ranges  Temp:  [97.4  F (36.3  C)-98.7  F (37.1  C)] 98.7  F (37.1  C)  Pulse:  [60] 60  Heart Rate:  [60-65] 60  Resp:  [18] 18  BP: (126-147)/(56-61) 147/59  SpO2:  [96 %-98 %] 96 %  I/O last 3 completed shifts:  In: 880 [P.O.:880]  Out: 2000 [Urine:2000]    Sitting up at bedside, NAD  Breathing unlabored   abd soft, NT, ND  Cook in place and draining light nita urine     Medications     Warfarin Therapy Reminder         amoxicillin  875 mg Oral Q12H SAMIR     atorvastatin  20 mg Oral At Bedtime     furosemide  40 mg Oral QAM      lisinopril  10 mg Oral At Bedtime     metoprolol succinate ER  25 mg Oral At Bedtime     sodium chloride (PF)  3 mL Intracatheter Q8H       Data   Results for orders placed or performed during the hospital encounter of 02/22/19 (from the past 24 hour(s))   Glucose by meter   Result Value Ref Range    Glucose 111 (H) 70 - 99 mg/dL   Glucose by meter   Result Value Ref Range    Glucose 114 (H) 70 - 99 mg/dL   Glucose by meter   Result Value Ref Range    Glucose 109 (H) 70 - 99 mg/dL   Glucose by meter   Result Value Ref Range    Glucose 101 (H) 70 - 99 mg/dL   INR   Result Value Ref Range    INR 2.43 (H) 0.86 - 1.14   Basic metabolic panel   Result Value Ref Range    Sodium 138 133 - 144 mmol/L    Potassium 4.0 3.4 - 5.3 mmol/L    Chloride 107 94 - 109 mmol/L    Carbon Dioxide 25 20 - 32 mmol/L    Anion Gap 6 3 - 14 mmol/L    Glucose 126 (H) 70 - 99 mg/dL    Urea Nitrogen 24 7 - 30 mg/dL    Creatinine 0.80 0.66 - 1.25 mg/dL    GFR Estimate 77 >60 mL/min/[1.73_m2]    GFR Estimate If Black 89 >60 mL/min/[1.73_m2]    Calcium 8.2 (L) 8.5 - 10.1 mg/dL   Glucose by meter   Result Value Ref Range    Glucose 122 (H) 70 - 99 mg/dL

## 2019-02-25 NOTE — PLAN OF CARE
Pt is A/O, [place]. VSS on RA. Denies pain. Morales in place. Up with assist of 1. Plan to discharge tomorrow.

## 2019-02-25 NOTE — PLAN OF CARE
Pt A&Ox3, disoriented to situation with intermittent confusion, forgetfulness.  Up with 1 assist with gait belt/walker.  Up in chair for meals.  VSS.  Denies pain.  Appetite good.  Morales draining nita/slightly red urine.   and 163.  CT Urogram/Chest ordered, pending.  Antibiotic changed to oral Amoxicillin today.  Hemoglobin stable.  Discharge pending tomorrow to TCU.  Continue to monitor.

## 2019-02-25 NOTE — PLAN OF CARE
Discharge Planner PT   Patient plan for discharge: Not stated  Current status: Pt required encouragement to participate in therapy. Declining OOR mobility. Education provided regarding purpose of PT during IP stay and importance of activity for overall health and lung function, pt cont to decline gait due to feeling too tired. Focus of session on seated LE exercises EOB. STS x 1 with CGA and FWW, pt fatigued after 10 standing marches   Barriers to return to prior living situation: Lives alone, fall risk, impaired activity tolerance, generalized weakness     Recommendations for discharge: TCU  Rationale for recommendations: Pt would benefit from daily therapy to improve strength, balance, gait, activity tolerance to optimize functional mobility for safe return to home         Entered by: Sharonda Severino 02/25/2019 11:57 AM

## 2019-02-26 NOTE — PROGRESS NOTES
CT shows evidence of patchy sclerotic densities in the iliac bones concerning for metastatic disease.   Oncology consultation regarding options, recommendations.   Hold on discharge until seen by oncology. Likely can leave later today unless oncology recommends he stay.

## 2019-02-26 NOTE — PROGRESS NOTES
UROLOGY     CT concerning for metastatic disease. Will ask oncology to see him.   Will coordinate outpatient voiding trial with Dr. Noel.     Binta Sheldon PA-C  [facility]   [email or web address]  Text Page (7:30am to 12pm)  **I will be unavailable after noon today, please page Marybeth Vázquez PA-C at [phone] with questions/concerns.

## 2019-02-26 NOTE — PROGRESS NOTES
Consult dictated.    89-year-old gentleman with prostate cancer.  He had orchiectomy done in [place] about 20 years ago.  Most likely he had metastatic disease for which orchiectomy was done.  Patient now has been admitted with weakness.  He has UTI and urinary retention.  CT scan reveals  enlarged prostate causing bladder obstruction.  CT also reveals a sclerotic bone lesion suspicious for bone metastasis.  PSA is elevated at 59.6.  Some of the elevation of PSA is from UTI.    Plan:  -Monitor his recovery for next few weeks.  If his overall condition improves, plan will be to do a bone scan and also recheck PSA.  If he has progressive prostate cancer, will discuss regarding starting him on oral Zytiga or enzalutamide.  Given his age, no further treatment will also be a reasonable option.  Further discussion in the clinic.

## 2019-02-26 NOTE — CONSULTS
Consult Date:  02/26/2019      This consult has been requested by Dr. Criselda Lance for prostate cancer.      Mr. Merino is a 89-year-old gentleman who was diagnosed with prostate cancer about 20 years ago.  He was in [place] when he was diagnosed with prostate cancer.  He had orchiectomy done.  None of those records are available.  Generally, orchectomy is done in the setting of metastatic disease.  Most likely, patient had metastatic disease at diagnosis.  He has not been following up with oncologist or urologist.      Patient was brought to the emergency room on 02/22/2019 for generalized weakness.  Over last few weeks, he progressively has been getting weaker.  He also has been falling.  Patient also was having urinary frequency.  He had multiple investigations done in the ER.   -CBC revealed anemia with hemoglobin 10.6.  Normal WBC and platelet.   -Chemistry panel reveals low protein and albumin.  Elevated alkaline phosphatase.   -UA was abnormal suggestive of UTI.   -Urine cultures subsequently grew Enterococcus faecalis.   -Chest x-ray revealed cardiomegaly and increased interstitial markings in both lungs and indicative of fluid overload.      Patient admitted to the hospital and started on antibiotics for UTI.  Patient had urinary retention and was seen by urologist.  He had a Morales catheter placed.  Further scans were ordered.      CT urogram was done on 02/25/2019.  There were no calculi or hydronephrosis.  No renal mass.  There is bladder wall thickening and trabeculation, which is likely related to chronic bladder outlet obstruction due to enlarged prostate.  Prostate is enlarged.  Bone window reveals sclerotic bony opacities in pelvic bones.  CT chest did not reveal any mass or lymphadenopathy.  Bones appear osteopenic.    PSA is elevated at 59.6.  Oncology has been consulted because of elevated PSA and sclerotic bone lesions suspicious for metastatic disease.      I met with the patient.  His weakness is  improving.  He is not in any severe pain.  Patient says that only treatment for prostate cancer was orchiectomy.  He has not been following up with a urologist or oncologist.        REVIEW OF SYSTEMS:  He is weak.  He is hard of hearing.  No headache.  No chest pain.  No shortness of breath.  No nausea or vomiting.  Appetite has been fair.  No bleeding.  All other review of systems negative.      ALLERGIES:  REVIEWED.      MEDICATIONS:  Reviewed.      PAST MEDICAL HISTORY:   1.  Prostate cancer.   2.  Atrial fibrillation.   3.  Coronary artery disease.   4.  CHF.   5.  Dyslipidemia.   6.  Hypertension.   7.  Pneumonia.   8.  Skin cancer.   9.  Knee arthroplasty bilaterally.    10.  Shoulder arthroplasty.    11.  Hip arthroplasty.    12.  Coronary artery stent placement.    13.  Pacemaker placement.      SOCIAL HISTORY:  No history of smoking.  No alcohol use.      PHYSICAL EXAMINATION:   GENERAL:  He was alert.  Hard of hearing.  Not in any distress.   VITAL SIGNS:  Reviewed.  ECOG PS of 3.      Rest of the systems not examined.      ASSESSMENT:   1.  A 89-year-old gentleman with prostate cancer diagnosed 20 years ago.  He had bilateral orchiectomy.  PSA is elevated. CT scan reveals sclerotic bone lesions suspicious for metastatic disease.   2.  Urinary tract infection.   3.  Urinary retention secondary to enlarged prostrate causing chronic bladder outlet obstruction.   4.  Multiple other medical problems including coronary artery disease and hypertension.      RECOMMENDATIONS:    1. I discussed with the patient regarding prostate cancer.  He was diagnosed 20 years ago and had orchectomy.  Likely he had metastatic disease at presentation.  None of those records are available.  I explained to the patient that his PSA is elevated.  Some of the elevation is because of his UTI.  CT reveals some sclerotic bone lesions.  This would go along with metastatic prostate cancer.      Discussed regarding further treatment for  prostate cancer.  At this time, I would recommend that we let him recover from his UTI.  In 1-2 months' time, we will check a PSA and also get a bone scan.  Treatment will depend on his PSA and bone scan.  If he has progressive metastatic prostate cancer, options are oral Zytiga or enzalutamide.      Given his age and multiple other medical problems, we may also consider not doing any further treatment for prostate cancer.  Chances are that he will die of old age or other medical condition than prostate cancer.  Further discussion in the clinic.      2. We will see him in the clinic in 4-6 weeks' time.  Recommendations discussed with Dr. Criselda Lance.      Thanks for the consult.      TOTAL TIME SPENT:  45 minute, more than 50% of the time was spent in counseling and coordination of care.         ETHAN CARDOZA MD             D: 2019   T: 2019   MT: AJ      Name:     JOHNNY KERN   MRN:      [id number]        Account:       [id number]   :      1925           Consult Date:  2019      Document: [id number]       cc: Ad Brenner MD

## 2019-02-26 NOTE — DISCHARGE SUMMARY
[facility]  Hospitalist Discharge Summary       Date of Admission:  2/22/2019  Date of Discharge:  2/26/2019  Discharging Provider: Criselda Lance MD      Discharge Diagnoses   Chronic progressive weakness   Prostate Cancer, metastatic - New Dx  UTI with urinary retention  Hematuria post cathater placement.   CAD,   Suspect decompensation of CHF, h/o systolic dysfunction  HTN, HLD   Atrial Fibrillation   S/p Bi-V ICD placement in 2013   Chronic normocytic anemia   Anticoagulation from Afib   DM, A1c 6.7 @ admission, new diagnosis    Follow-ups Needed After Discharge   Follow-up Appointments     Follow Up ([facility])      Follow up with Dr. Noel on 3/15/19 at 9:40AM for catheter   removal and urology follow up.     [facility] ([facility])  [address] [place] [address], [address]  [place]. [address]  You may call [phone] with any questions or concerns.   Central Appointment #: [phone]         Follow Up and recommended labs and tests      Follow up with skilled nursing physician.  The following labs/tests are   recommended: BMP, hgb INR by Monday, 2/4. A1c in 3 months.   Follow up with Dr. De La Cruz in the next 2 weeks to discuss treatment option   for prostate cancer.  Follow up with Urology per their recommendations.             Unresulted Labs Ordered in the Past 30 Days of this Admission     No orders found from 12/24/2018 to 2/23/2019.          Discharge Disposition   Discharged to nursing home  Condition at discharge: Stable    Hospital Course      Bret Merino is a pleasant 89 year old gentleman with extensive past medical history that is most notable for CAD,     chronic systolic CHF, chronic atrial fibrillation with Bi-V ICD on Jantoven, hypertension, dyslipidemia and remote history of prostate cancer status post Orchiectomy who presents with progressive weakness.     Chronic progressive weakness - x 4-6 weeks, No acute metabolic  disturbance. Suspect secondary to a combination of UTI and decompensated heart failure with fall with progressive anemia, and prostate cancer.     - PT recommending TCU. Discussed with patient on 02/23 and 2/24/19. He is agreeable to having more assistance but is working with daughter to find AL rather than TCU. He did not want me to call his daughter.   - Treatment of UTI and CHF as below.     Prostate Cancer - Mr. Merino is s/p orchiectomy in [place] remotely. One note in his history in Care Everywhere records a negative PSA in 2011; No further details of his urologic history. PSA 59  - Appreciate urology input.   - CT chest / abdomen / pelvis with CT urogram on 02/25/19: Patchy sclerotic densities in the iliac bones concerning for metastatic disease.  - Dr. De La Cruz of oncology saw patient on 02/26/19, and recommend follow up with him in 2 weeks. At that time he'll do a bone scan and determine whether he should consider chemotherapy. May just observe given age and decreased functional status.   - Outpatient f/u with Dr. Liz in 1-2 weeks.     UTI with urinary retention  Hematuria post cathater placement.   - without systemic signs of infection. Afebrile, hemodynamically stable. s/p Cook catheter placement to evacuate > 1 L of urine with pyuria and urinary frequency,     - Ceftriaxone changed to PO amoxicillin on 2/25 to complete 7 day course  - UCx grew enterococcus sensitive to all abx tested.   - Urology consulted. > Continue cook catheter at discharge with with cystoscopy at follow up  - CT urogram ordered for today, 02/25/19.showed trabeculation of bladder likely related to chronic bladder outlet obstruction from prostate enlargement.   - Resumed coumadin on 02/25/19. Discussed with Urology.        CAD,   Suspect decompensation of CHF, h/o systolic dysfunction  HTN, HLD   Atrial Fibrillation   S/p Bi-V ICD placement in 2013   It is difficult at present to ascertain precise details of medical history. One  "note in history in Care Everywhere says \"1996 first stent. approx 2005, stent, 3/2011patent prox Cx stent, 1st diag 75%, LE placed. 100% LAD not treated. EF 25%, EF 60% in 10/2018 His device was last interrogated 11/2018 and reportedly reassuring.   On admission he had mild leg edema, elevated BNP (2,107), and prominent external jugular vein on physical exam. CXR showed mild interstitial edema. Troponin negative at admission and sx chronic.   Echo on 02/23/19 EF of 55%, RV nl, Mild pulmonary HTN. Mild Aortic stenosis. Reduced respiratory collapse of IVC, nl size.  TSH nl @ 0.87.     - Continue PTA lisinopril, atorvastatin, metoprolol   - Lasix 20 mg IV BID started on admission > change to lasix 40 mg daily, first dose 1600 on 02/24/19.   - 90.7 > 94 kg on 02/25/19 (? Accuracy, as hgb up, swelling down and net output is neg 6.6 L    Chronic normocytic anemia No evidence of acute GI bleeding. Per review of Care Everywhere baseline hgb 10-11. Hgb 11.2 on 2/14 > 10.6 on admission.   - Appears decrease in hgb may have been dilutional from decompensated CHF.   Recent Labs   Lab 02/24/19  0813 02/23/19  1543 02/23/19  0744 02/22/19  1846   HGB 11.0* 9.8* 9.9* 10.6*         Anticoagulation from Afib     - Continue coumadin as prior to admission at 2.5 mg daily. Held x 1 on 2/24 as we were awaiting hematuria to resolve. Urine dark, not red on 02/26/19.   - Continue coumadin as PTA at discharge. INR with follow up labs next Monday.     Recent Labs   Lab 02/26/19  0811 02/25/19  0751 02/24/19  0813 02/23/19  0744 02/22/19  2150 02/22/19  1846   INR 2.20* 2.43* 2.54* 2.43* 2.57* Canceled, Test credited           DM, A1c 6.7 @ admission, new diagnosis  Recent Labs   Lab 02/26/19  1209 02/26/19  0811 02/26/19  0809 02/26/19  0209 02/25/19  2157 02/25/19  1807 02/25/19  1201  02/25/19  0751  02/24/19  0813  02/23/19  0744  02/22/19  1846   GLC  --  129*  --   --   --   --   --   --  126*  --  157*  --  110*  --  102*   *  " --  122* 116* 133* 119* 163*   < >  --    < >  --    < >  --    < >  --     < > = values in this interval not displayed.         - At his age with A1c < 7, would not add new medication, control with diet.   - A1c in 3 months.   - Follow BS if symptomatic.     Consultations This Hospital Stay   PHARMACY TO DOSE WARFARIN  UROLOGY IP CONSULT  PHARMACY TO DOSE WARFARIN  PHYSICAL THERAPY ADULT IP CONSULT  SOCIAL WORK IP CONSULT  PHARMACY TO DOSE WARFARIN  HEMATOLOGY & ONCOLOGY IP CONSULT  HEMATOLOGY & ONCOLOGY IP CONSULT  PHYSICAL THERAPY ADULT IP CONSULT  OCCUPATIONAL THERAPY ADULT IP CONSULT    Code Status   Full Code    Time Spent on this Encounter   I, Criselda Lance, personally saw the patient today and spent greater than 30 minutes discharging this patient.       Criselda Lance MD  [facility]  ______________________________________________________________________    Physical Exam   Vital Signs: Temp: 98.5  F (36.9  C) Temp src: Oral BP: 141/55 Pulse: 62 Heart Rate: 61 Resp: 16 SpO2: 97 % O2 Device: None (Room air)    Weight: 207 lbs .19 oz  Constitutional:  NAD,   Neuropsyche: alert and oriented, answers questions appropriately.   Respiratory: Breathing comfortably, good air exchange, no wheezes, no crackles.   Cardiovascular:  Regular rate and rhythm, no edema.  GI:  soft, NT/ND, BS normal  Skin/Integumen:  No acute rash, bruising or sign of bleeding.          Primary Care Physician   Ad Brenner    Immunizations       Discharge Orders      Follow Up ([facility])    Follow up with Dr. Noel on 3/15/19 at 9:40AM for catheter removal and urology follow up.     [facility] ([facility])  [address], [address]  [place]. [address]  You may call [phone] with any questions or concerns.   Central Appointment #: [phone]     General info for SNF    Length of Stay Estimate: Short Term Care: Estimated # of Days  <30  Condition at Discharge: Improving  Level of care:skilled   Rehabilitation Potential: Fair  Admission H&P remains valid and up-to-date: Yes  Recent Chemotherapy: N/A  Use Nursing Home Standing Orders: Yes     Mantoux instructions    Give two-step Mantoux (PPD) Per Facility Policy Yes     Reason for your hospital stay    You were admitted with progressive weakness and found to have a urinary retention with a UTI, mild heart failure, and advanced prostate cancer.     Morales catheter    To straight gravity drainage. Change catheter every 2 weeks and PRN for leaking or decreased uring output with signs of bladder distention. DO NOT change catheter without a specific MD order IF diagnosis of benign prostatic hypertrophy (BPH), neurogenic bladder, or other urological conditions     Follow Up and recommended labs and tests    Follow up with longterm physician.  The following labs/tests are recommended: BMP, hgb INR by Monday, 2/4. A1c in 3 months.   Follow up with Dr. De La Cruz in the next 2 weeks to discuss treatment option for prostate cancer.  Follow up with Urology per their recommendations.     Activity - Up with nursing assistance     Physical Therapy Adult Consult    Evaluate and treat as clinically indicated.    Reason:  Deconditioned secondary to acute illness.     Occupational Therapy Adult Consult    Evaluate and treat as clinically indicated.    Reason:   Deconditioned secondary to acute illness.   .     Advance Diet as Tolerated    Moderate Consistent CHO (4-6 CHO units/meal); Low Saturated Fat Na <2400mg Diet, No Caffeine Diet       Significant Results and Procedures   Most Recent 3 CBC's:  Recent Labs   Lab Test 02/24/19  0813 02/23/19  1543 02/23/19  0744 02/22/19  1846 02/14/19  0908   WBC  --   --  5.8 7.8 8.0   HGB 11.0* 9.8* 9.9* 10.6* 11.2*   MCV  --   --  93 93 92   PLT  --   --  191 214 180     Most Recent 3 BMP's:  Recent Labs   Lab Test 02/26/19  0811 02/25/19  0751 02/24/19  0813    138  137   POTASSIUM 4.0 4.0 3.8   CHLORIDE 106 107 106   CO2 24 25 23   BUN 23 24 23   CR 0.86 0.80 0.87   ANIONGAP 8 6 8   BRIE 8.0* 8.2* 8.3*   * 126* 157*     Most Recent 2 LFT's:  Recent Labs   Lab Test 02/23/19  0744 02/22/19  1846   AST 27 26   ALT 26 32   ALKPHOS 150 157*   BILITOTAL 1.2 1.0     Most Recent 3 INR's:  Recent Labs   Lab Test 02/26/19  0811 02/25/19  0751 02/24/19  0813   INR 2.20* 2.43* 2.54*     Most Recent 3 Troponin's:  Recent Labs   Lab Test 02/22/19  1846 02/14/19  0908   TROPI 0.018 0.018     Most Recent 3 BNP's:  Recent Labs   Lab Test 02/22/19  1846 02/14/19  0908   NTBNPI 2,107* 2,112*     Most Recent 6 Bacteria Isolates From Any Culture (See EPIC Reports for Culture Details):  Recent Labs   Lab Test 02/22/19  1830   CULT >100,000 colonies/mL  Enterococcus faecalis  *     Most Recent TSH and T4:  Recent Labs   Lab Test 02/23/19  0744   TSH 0.87     Most Recent Urinalysis:  Recent Labs   Lab Test 02/22/19  1830   COLOR Yellow   APPEARANCE Slightly Cloudy   URINEGLC Negative   URINEBILI Negative   URINEKETONE Negative   SG 1.019   UBLD Trace*   URINEPH 6.0   PROTEIN 10*   NITRITE Positive*   LEUKEST Moderate*   RBCU 7*   WBCU 72*       ,   Results for orders placed or performed during the hospital encounter of 02/22/19   XR Chest Port 1 View    Narrative    CHEST ONE VIEW PORTABLE   2/22/2019 9:35 PM     HISTORY: Dyspnea    COMPARISON: 2/14/2019      Impression    IMPRESSION: Cardiomegaly, implantable cardioverter defibrillator in  the heart. Increased interstitial markings in both lungs suggest  pulmonary edema, new since previous exam.    TERRANCE DEGROOT MD   CT Urogram wo & w Contrast    Narrative    CT UROGRAM WITHOUT AND WITH CONTRAST  2/25/2019 6:02 PM     HISTORY: Abnormal findings in urine. Gross hematuria, prostate cancer.    TECHNIQUE: Axial images are obtained from the lung bases to the  symphysis without IV contrast. Following the administration of 120 mL  of Isovue 370,  additional axial images are obtained from the lung  bases to the symphysis during corticomedullary and excretory phases.  Coronal and sagittal reformatted images are also generated. Radiation  dose for this scan was reduced using automated exposure control,  adjustment of the mA and/or kV according to patient size, or iterative  reconstruction technique.    FINDINGS:     Atelectasis left lung base and trace pleural effusions are noted.    Abdomen: The liver, spleen, decompressed gallbladder, pancreas and  adrenal glands are unremarkable. No enlarged lymph nodes. Aorta is  calcified without aneurysm or dissection. No bowel obstruction or  diverticulitis. Appendix is normal.    Right urinary tract:  No renal masses or cysts are present. No  collecting system or ureteral stones are present. There is no  hydronephrosis. Delayed imaging demonstrates a normal right renal  collecting system and ureter. Ureter is normal in caliber and course.    Left urinary tract:  No renal masses or cysts are present. No  collecting system or ureteral stones are present. There is no  hydronephrosis. Delayed imaging demonstrates a normal left renal  collecting system and ureter. Ureter is normal in caliber and course.    Bladder:  No bladder calculi are evident. Morales catheter balloon is  present within the bladder along with a small amount of air likely  from placement of the Morales catheter. Bladder wall appears thickened  circumferentially. Postcontrast images show no obvious filling  defects. Bladder wall trabeculation is noted possibly reflecting  changes of chronic bladder outlet obstruction due to an enlarged  prostate gland.    Pelvis:  Prostate gland is enlarged. Rectum is unremarkable. No  enlarged pelvic lymph nodes or free fluid. Bone window examination  demonstrates severe lower lumbar degenerative disc changes at L3-L4  and L4-L5. Patient has a total right hip replacement. Probable SI  joint fusion noted bilaterally. Sclerotic  bony opacities in the pelvic  bones would be suspicious for metastatic bone disease in this patient  with known prostate cancer.      Impression    IMPRESSION:  1. No urinary tract calculi or hydronephrosis. No renal cyst or mass.  Differential bladder wall thickening and trabeculation may be related  to chronic bladder outlet obstruction due to an enlarged prostate  gland.  2. Degenerative lumbar spine changes and patchy sclerotic densities in  the iliac bones concerning for metastatic bone disease related to  patient's underlying prostate cancer.  3. Extensive calcified plaque involving the abdominal aorta and branch  vessels without evidence of aneurysm or dissection.  4. No bowel obstruction, diverticulitis or appendicitis.  5. Trace pleural effusions and bibasilar atelectasis.    STEVE SCANLON MD   CT Chest w Contrast    Narrative    CT CHEST WITH CONTRAST 2/25/2019 6:01 PM     HISTORY: Urothelial carcinoma of prostate, post biopsy, stromal  invasion, staging. Recurrent prostate cancer.    TECHNIQUE: Axial images from the thoracic inlet to the lung bases are  performed with additional coronal reformatted images. 80 mL of Isovue  370 are given intravenously.  Radiation dose for this scan was reduced  using automated exposure control, adjustment of the mA and/or kV  according to patient size, or iterative reconstruction technique.    FINDINGS:     Chest: Mild bibasilar atelectasis is present. Trace pleural effusions  are noted. The heart is enlarged. Implantable cardiac device leads in  the right atrium, right ventricle and along the left lateral cardiac  border are noted. Esophagus is unremarkable. No enlarged lymph nodes.  Thyroid gland appears normal in size. Patient has a right shoulder  joint replacement. Extensive coronary artery calcification is present.  Thoracic aorta demonstrates scattered calcified plaque without  aneurysm or dissection. Bone window examination demonstrates  degenerative spine changes.  Bones overall appear osteopenic. No  obvious destructive thoracic spine changes are appreciated.      Impression    IMPRESSION:  1. No acute changes in the chest. Trace pleural effusions. No  pulmonary mass is appreciated.  2. No enlarged lymph nodes in the chest or axillas.  3. Extensive coronary artery calcification. Calcified plaque also  noted in the thoracic aorta without aneurysm or dissection.    STEVE SCANLON MD       Discharge Medications   Current Discharge Medication List      START taking these medications    Details   amoxicillin (AMOXIL) 875 MG tablet Take 1 tablet (875 mg) by mouth every 12 hours for 6 days  Qty: 20 tablet    Associated Diagnoses: Acute cystitis with hematuria; Chronic diastolic HF (heart failure) (H)      furosemide (LASIX) 40 MG tablet Take 1 tablet (40 mg) by mouth every morning  Qty: 30 tablet    Associated Diagnoses: Chronic diastolic HF (heart failure) (H)         CONTINUE these medications which have NOT CHANGED    Details   acetaminophen (TYLENOL) 325 MG tablet Take 650 mg by mouth every 6 hours as needed for mild pain      atorvastatin (LIPITOR) 20 MG tablet Take 20 mg by mouth At Bedtime       lisinopril (PRINIVIL/ZESTRIL) 10 MG tablet Take 10 mg by mouth At Bedtime       metoprolol succinate (TOPROL-XL) 25 MG 24 hr tablet Take 25 mg by mouth At Bedtime       warfarin (JANTOVEN) 2.5 MG tablet Take 1 tablet (2.5 mg) by mouth daily As directed  Qty: 90 tablet, Refills: 3    Associated Diagnoses: Chronic atrial fibrillation (H)      ASPIRIN NOT PRESCRIBED (INTENTIONAL) Please choose reason not prescribed, below  Qty: 0 each, Refills: 0    Associated Diagnoses: Chronic atrial fibrillation (H)           Allergies   Allergies   Allergen Reactions     Aleve [Naproxen]

## 2019-02-26 NOTE — PLAN OF CARE
Pt is A&O x 2, disoriented to situation and place. Intermitted confused. VSS on RA, denied pain. Morales patent with adequate output. Receiving PO abx. CT urogram and chest CT done. Up x 1-2 assist to chair for meals, Mod CHO diet and tolerating well. No BM during this shift. Dressing to bilateral elbows and coccyx area CDI. Discharge pending in progress. Continue to monitor.

## 2019-02-26 NOTE — PROGRESS NOTES
SW:  D:  Patient discharging at 1600 via [facility] to [facility].  Orders have been sent thru the [facility] system to [facility].  Writer confirmed arrangements with Kaylee at [facility] [phone].  She did not need the PAS form but took the confirmation number.  Daughter aware of plan.  Patient sleeping, did not wake him. Bedside nurse updated.  .PA approved.  Effective date: 2/26/19  PA reference #:[id number]  Pt. notified:  Daughter

## 2019-02-26 NOTE — PLAN OF CARE
Discharge    Patient discharged to [facility] via [facility] transportion with EMS transport  Care plan note: Pt is A&O x 3, disoriented to situation. VSS on RA. Denied pain and SOB. AVS printout explained to pt, questions answered, pt expressed understanding. Pt is being discharged to [facility]. Transportation provided by [employer]. Pt left from station 66 at 1630    Listed belongings gathered and returned to patient. Yes  Care Plan and Patient education resolved: Yes  Prescriptions if needed, hard copies sent with patient  NA  Home and hospital acquired medications returned to patient: NA  Medication Bin checked and emptied on discharge Yes  Follow up appointment made for patient: Yes

## 2019-02-26 NOTE — PLAN OF CARE
Discharge Planner PT   Patient plan for discharge: Not stated  Current status: Patient up in chair upon arrival; initially not receptive to participating. With encouragement, patient agreeable to ambulate. Sit to stand transfer to FWW completed with Min A. Ambulated 15 ft to and from the bathroom with FWW and CGA. Toilet transfer completed with Mod A x 1. Patient c/o fatigue and weakness, requesting to return to bed. Sit to supine using log roll technique and Min A. Positioned for comfort. Declined exercises this session.  Barriers to return to prior living situation: Lives alone, fall risk, impaired activity tolerance, generalized weakness   Recommendations for discharge: TCU per plan established by the PT.  Rationale for recommendations: Pt would benefit from daily therapy to improve strength, balance, gait, activity tolerance to optimize functional mobility for safe return to home  Entered by: Elba Hamilton 02/26/2019 10:53 AM        Patient discharged to TCU. PT goals not met.

## 2019-02-26 NOTE — PROGRESS NOTES
MD Notification    Notified Person: MD    Notified Person Name: CINDY Lance    Notification Date/Time: now    Notification Interaction: paged    Purpose of Notification:  Orders for discharge.   notes state, transportation for 10:00 am today.  To TCU.    Orders Received:    Comments:

## 2019-02-26 NOTE — PLAN OF CARE
AOx3, reoriented to situation.  VSS on r/a, denies pain.  Up with 1 and GB.     To chair for meals today.  Tolerating mod carb diet.  , 189 today.     INR 2.2 now.  Oncology has seen patient, no [id number] treatment.  Transportation established for 16:00.  MD has updated daughter regarding plans for after care.    Discharge with cook, per MD Lance's orders and verbal.

## 2019-02-26 NOTE — PLAN OF CARE
A&Ox2-3, disoriented to situation. VSS on RA. Up with assistance x1, GB and walker. Mod carb/cardiac diet, 0200 . Multiple skin tears, foam dressings on, CDI. [place]. Morales patent, draining tea colored urine. On coumadin, PO amoxicillin, PO lasix. Discharge to TCU pending progress. IV SL. Will continue to monitor.

## 2019-02-26 NOTE — PROGRESS NOTES
SW:  D:  Kaylee at [facility] [phone] has been notified of patient's delay in discharge pending oncology recommendation.  Daughter Charmaine [phone] also notified.  Transportation has been changed to 1600 via [employer].    TCU and daughter are aware discharge depends upon oncology recommendation.  Daughter requests a phone call either from Oncology or Dr Lance.

## 2019-02-28 NOTE — LETTER
"    2/28/2019        RE: Bret Merino  [address] [place] [id number]        [facility]  PRIMARY CARE PROVIDER AND CLINIC:  Ad Brenner [address] [place] [address] / [facility] [address]  Chief Complaint   Patient presents with     Hospital F/U     [facility] Medical Record Number:  [id number]  Place of Service where encounter took place:  [facility] ([facility] [[id number]]    HPI:    Bret Merino is a 89 year old  (6/17/1925),admitted to the above facility from  [facility].  Hospital stay 2/22/19 through 2/26/19.  Admitted to this facility for  rehab, medical management and nursing care.  HPI information obtained from: facility chart records, facility staff, patient report and [facility] chart review. Hospital course per review of discharge summary:    This is a 89-year-old male, with a past medical history significant for coronary artery disease with stent placement, chronic systolic heart failure, chronic atrial fibrillation with Bi-V ICD on Jantoven, hypertension, hyperlipidemia and prostate cancer s/p orchiectomy, who  Initially present to [facility] ED on 2/14/19 with dizziness and weakness. No acute findings noted including chest x-ray and abdominal ultrasound. Discharged home. Returned to [facility] on 2/22/19 with progressive weakness. A urine culture revealed > 100,000 col/ml Enterococcus faecalis. Required cook catheter for urinary retention. Developed hematuria. Urology was consulted. A CT urogram revealed \"...Differential bladder wall thickening and trabeculation may be related to chronic bladder outlet obstruction due to an enlarge prostate gland. 2. Degenerative lumbar spine changes and patchy sclerotic densities in the iliac bones concerning for metastatic bone disease...\". Oncology was consulted and will follow-up outpatient with bone scan. Given bilateral lower extremity edema, BNP 2107, prominent external " "jugular vein on physical exam and chest x-ray with increased interstitial markings suggestive of pulmonary edema, decompensation of congestive heart failure also thought to be contributing to weakness. An echocardiogram on 2/23/19 revealed an ET 55%. IV diuresis was initiated. A TCU stay was recommended until alternative Assisted Living placement could be arranged.     Current issues are:        When asked about hospital stay, states \"I don't know too much about it\". Lived at [facility]. Would like toe nails trimmed. Denies shortness of breath and chest pain.    Per discussion with son, Len, patient was receiving meals at [facility]. Had his own apartment, but has been declining. Feels this is his father's final move to long term care. Does not like going out to appointments, but is agreeable to seeing Urologist and Oncologist as recommended at hospital discharge.     CODE STATUS/ADVANCE DIRECTIVES DISCUSSION:   DNR/DNI per discussion with patient and son. Hospital orders Full code  Patient's living condition: lives alone    ALLERGIES:Aleve [naproxen]  PAST MEDICAL HISTORY:  has a past medical history of A-fib (H), CAD (coronary artery disease), CHF (congestive heart failure) (H), Dyslipidemia, History of prediabetes, Hypertension, Pneumonia (01/2018), Prostate cancer (H), and Skin cancer.  PAST SURGICAL HISTORY:  has a past surgical history that includes Orchiectomy scrotal bilateral; H CORONARY INTERVENTION; Implant pacemaker; Arthroplasty knee bilateral; TOTAL HIP ARTHROPLASTY (Right); and Arthroplasty shoulder (Right).  FAMILY HISTORY: family history is not on file.  SOCIAL HISTORY:  reports that  has never smoked. he has never used smokeless tobacco. He reports that he does not drink alcohol or use drugs.    Post Discharge Medication Reconciliation Status: discharge medications reconciled, continue medications without change.  Current Outpatient Medications   Medication Sig Dispense Refill     " acetaminophen (TYLENOL) 325 MG tablet Take 650 mg by mouth every 6 hours as needed for mild pain       amoxicillin (AMOXIL) 875 MG tablet Take 1 tablet (875 mg) by mouth every 12 hours for 6 days 20 tablet      atorvastatin (LIPITOR) 20 MG tablet Take 20 mg by mouth At Bedtime        furosemide (LASIX) 40 MG tablet Take 1 tablet (40 mg) by mouth every morning 30 tablet      lisinopril (PRINIVIL/ZESTRIL) 10 MG tablet Take 10 mg by mouth At Bedtime        metoprolol succinate (TOPROL-XL) 25 MG 24 hr tablet Take 25 mg by mouth At Bedtime        warfarin (JANTOVEN) 2.5 MG tablet Take 1 tablet (2.5 mg) by mouth daily As directed (Patient taking differently: Take 2.5 mg by mouth daily 2.5 mg daily) 90 tablet 3     ASPIRIN NOT PRESCRIBED (INTENTIONAL) Please choose reason not prescribed, below 0 each 0       ROS:  4 point ROS including Respiratory, CV, GI and , other than that noted in the HPI,  is negative    Exam:  /60   Pulse 80   Temp 99.3  F (37.4  C)   Resp 18   SpO2 95%   GENERAL APPEARANCE:  Alert, in no distress  ENT:  Mouth and posterior oropharynx normal, moist mucous membranes  EYES:  EOM, conjunctivae, lids, pupils and irises normal  RESP:  respiratory effort and palpation of chest normal, lungs clear to auscultation , no respiratory distress  CV:  Palpation and auscultation of heart done , regular rate and rhythm, no murmur, rub, or gallop  ABDOMEN:  normal bowel sounds, soft, nontender, no hepatosplenomegaly or other masses  M/S:   Active movement of bilateral upper and lower extremities. No edema.  SKIN:  Multiple abrasions on top of head  NEURO:   Cranial nerves 2-12 are normal tested and grossly at patient's baseline  PSYCH:  affect and mood normal    Lab/Diagnostic data:  CBC RESULTS:   Recent Labs   Lab Test 02/24/19  0813 02/23/19  1543 02/23/19  0744 02/22/19  1846   WBC  --   --  5.8 7.8   RBC  --   --  3.22* 3.50*   HGB 11.0* 9.8* 9.9* 10.6*   HCT  --   --  29.9* 32.5*   MCV  --   --  93  "93   MCH  --   --  30.7 30.3   MCHC  --   --  33.1 32.6   RDW  --   --  14.2 14.2   PLT  --   --  191 214       Last Basic Metabolic Panel:  Recent Labs   Lab Test 02/26/19  0811 02/25/19  0751    138   POTASSIUM 4.0 4.0   CHLORIDE 106 107   BRIE 8.0* 8.2*   CO2 24 25   BUN 23 24   CR 0.86 0.80   * 126*       Liver Function Studies -   Recent Labs   Lab Test 02/23/19  0744 02/22/19  1846   PROTTOTAL 5.9* 6.5*   ALBUMIN 2.6* 3.1*   BILITOTAL 1.2 1.0   ALKPHOS 150 157*   AST 27 26   ALT 26 32       TSH   Date Value Ref Range Status   02/23/2019 0.87 0.40 - 4.00 mU/L Final   ]    Lab Results   Component Value Date    A1C 6.7 02/23/2019     ASSESSMENT/PLAN:  Urinary Tract Infection with Hematuria/Urinary Retention. > 100,000 col/ml Enterococcus faecalis on 2/22/19. Continue Amoxicillin as ordered x 6 days to complete antibiotic course. Follow-up with Dr. Frost on 3/15/19 for catheter removal. Cystoscopy also to be performed outpatient. Physical and Occupational Therapy ordered for deconditioning. Son states patient will be staying at facility long term.     Abnormal CT Urogram with History of Prostate Cancer S/P Orchiectomy. CT Urogram on 2/25/19 revealed \"patchy sclerotic densities in the iliac bone concerning for metastatic disease\". Follow-up with Dr. De La Cruz on 3/21/19 as scheduled. Bone scan to be performed at that time. Further recommendations pending bone scan results.    Progressive Weakness. May be related to above. Physical and Occupational Therapy ordered.  to assist with appropriate discharge disposition.     Coronary Artery Disease S/P Multiple Stent Placement/Sick Sinus Syndrome S/P Dual-Chamber Pacemaker with Upgrade to Biventricular ICD placement in 2013/Chronic Atrial Fibrillation on Anticoagulation/Cardiomyopathy. Followed by [facility]. EF 55% on 2/23/19. Received IV diuresis during hospitalization. Weight 207 lbs at hospital discharge. Monitor " blood pressure weekly. Continue Atorvastatin, Furosemide, Lisinopril and Metoprolol as ordered. PTA dose of Warfarin 2.5 mg daily. Will repeat INR on 3/5/19 to ensure stability.    Type 2 Diabetes Mellitus. A1C 6.7 on 2/23/19. Given age and co-morbidities, no treatment indicated as goal A1C is < 8. Will not order routine blood sugars. Monitor blood sugars PRN if symptomatic.      Total time spent with patient visit at the skilled nursing facility was 35 min including patient visit, review of past records and phone call to patient contact. Greater than 50% of total time spent with counseling and coordinating care due to review of past medical history, review of hospitalization and discussion with patient and son    Electronically signed by:  MATTIE Baron CNP                    Sincerely,        MATTIE Baron CNP

## 2019-02-28 NOTE — PROGRESS NOTES
"[facility]  PRIMARY CARE PROVIDER AND CLINIC:  Ad Brenner [address] [facility] / [facility] [address]  Chief Complaint   Patient presents with     Hospital F/U     [facility] Medical Record Number:  [id number]  Place of Service where encounter took place:  [facility] ([facility]) [[id number]]    HPI:    Bret Merino is a 89 year old  (6/17/1925),admitted to the above facility from  [facility].  Hospital stay 2/22/19 through 2/26/19.  Admitted to this facility for  rehab, medical management and nursing care.  HPI information obtained from: facility chart records, facility staff, patient report and [facility] chart review. Hospital course per review of discharge summary:    This is a 89-year-old male, with a past medical history significant for coronary artery disease with stent placement, chronic systolic heart failure, chronic atrial fibrillation with Bi-V ICD on Jantoven, hypertension, hyperlipidemia and prostate cancer s/p orchiectomy, who  Initially present to [facility] ED on 2/14/19 with dizziness and weakness. No acute findings noted including chest x-ray and abdominal ultrasound. Discharged home. Returned to [facility] on 2/22/19 with progressive weakness. A urine culture revealed > 100,000 col/ml Enterococcus faecalis. Required cook catheter for urinary retention. Developed hematuria. Urology was consulted. A CT urogram revealed \"...Differential bladder wall thickening and trabeculation may be related to chronic bladder outlet obstruction due to an enlarge prostate gland. 2. Degenerative lumbar spine changes and patchy sclerotic densities in the iliac bones concerning for metastatic bone disease...\". Oncology was consulted and will follow-up outpatient with bone scan. Given bilateral lower extremity edema, BNP 2107, prominent external jugular vein on physical exam and chest x-ray with increased interstitial markings " "suggestive of pulmonary edema, decompensation of congestive heart failure also thought to be contributing to weakness. An echocardiogram on 2/23/19 revealed an ET 55%. IV diuresis was initiated. A TCU stay was recommended until alternative Assisted Living placement could be arranged.     Current issues are:        When asked about hospital stay, states \"I don't know too much about it\". Lived at [facility]. Would like toe nails trimmed. Denies shortness of breath and chest pain.    Per discussion with son, Len, patient was receiving meals at [facility]. Had his own apartment, but has been declining. Feels this is his father's final move to long term care. Does not like going out to appointments, but is agreeable to seeing Urologist and Oncologist as recommended at hospital discharge.     CODE STATUS/ADVANCE DIRECTIVES DISCUSSION:   DNR/DNI per discussion with patient and son. Hospital orders Full code  Patient's living condition: lives alone    ALLERGIES:Aleve [naproxen]  PAST MEDICAL HISTORY:  has a past medical history of A-fib (H), CAD (coronary artery disease), CHF (congestive heart failure) (H), Dyslipidemia, History of prediabetes, Hypertension, Pneumonia (01/2018), Prostate cancer (H), and Skin cancer.  PAST SURGICAL HISTORY:  has a past surgical history that includes Orchiectomy scrotal bilateral; H CORONARY INTERVENTION; Implant pacemaker; Arthroplasty knee bilateral; TOTAL HIP ARTHROPLASTY (Right); and Arthroplasty shoulder (Right).  FAMILY HISTORY: family history is not on file.  SOCIAL HISTORY:  reports that  has never smoked. he has never used smokeless tobacco. He reports that he does not drink alcohol or use drugs.    Post Discharge Medication Reconciliation Status: discharge medications reconciled, continue medications without change.  Current Outpatient Medications   Medication Sig Dispense Refill     acetaminophen (TYLENOL) 325 MG tablet Take 650 mg by mouth every 6 hours as needed for mild " pain       amoxicillin (AMOXIL) 875 MG tablet Take 1 tablet (875 mg) by mouth every 12 hours for 6 days 20 tablet      atorvastatin (LIPITOR) 20 MG tablet Take 20 mg by mouth At Bedtime        furosemide (LASIX) 40 MG tablet Take 1 tablet (40 mg) by mouth every morning 30 tablet      lisinopril (PRINIVIL/ZESTRIL) 10 MG tablet Take 10 mg by mouth At Bedtime        metoprolol succinate (TOPROL-XL) 25 MG 24 hr tablet Take 25 mg by mouth At Bedtime        warfarin (JANTOVEN) 2.5 MG tablet Take 1 tablet (2.5 mg) by mouth daily As directed (Patient taking differently: Take 2.5 mg by mouth daily 2.5 mg daily) 90 tablet 3     ASPIRIN NOT PRESCRIBED (INTENTIONAL) Please choose reason not prescribed, below 0 each 0       ROS:  4 point ROS including Respiratory, CV, GI and , other than that noted in the HPI,  is negative    Exam:  /60   Pulse 80   Temp 99.3  F (37.4  C)   Resp 18   SpO2 95%   GENERAL APPEARANCE:  Alert, in no distress  ENT:  Mouth and posterior oropharynx normal, moist mucous membranes  EYES:  EOM, conjunctivae, lids, pupils and irises normal  RESP:  respiratory effort and palpation of chest normal, lungs clear to auscultation , no respiratory distress  CV:  Palpation and auscultation of heart done , regular rate and rhythm, no murmur, rub, or gallop  ABDOMEN:  normal bowel sounds, soft, nontender, no hepatosplenomegaly or other masses  M/S:   Active movement of bilateral upper and lower extremities. No edema.  SKIN:  Multiple abrasions on top of head  NEURO:   Cranial nerves 2-12 are normal tested and grossly at patient's baseline  PSYCH:  affect and mood normal    Lab/Diagnostic data:  CBC RESULTS:   Recent Labs   Lab Test 02/24/19  0813 02/23/19  1543 02/23/19  0744 02/22/19  1846   WBC  --   --  5.8 7.8   RBC  --   --  3.22* 3.50*   HGB 11.0* 9.8* 9.9* 10.6*   HCT  --   --  29.9* 32.5*   MCV  --   --  93 93   MCH  --   --  30.7 30.3   MCHC  --   --  33.1 32.6   RDW  --   --  14.2 14.2   PLT  --  "  --  191 214       Last Basic Metabolic Panel:  Recent Labs   Lab Test 02/26/19  0811 02/25/19  0751    138   POTASSIUM 4.0 4.0   CHLORIDE 106 107   BRIE 8.0* 8.2*   CO2 24 25   BUN 23 24   CR 0.86 0.80   * 126*       Liver Function Studies -   Recent Labs   Lab Test 02/23/19  0744 02/22/19  1846   PROTTOTAL 5.9* 6.5*   ALBUMIN 2.6* 3.1*   BILITOTAL 1.2 1.0   ALKPHOS 150 157*   AST 27 26   ALT 26 32       TSH   Date Value Ref Range Status   02/23/2019 0.87 0.40 - 4.00 mU/L Final   ]    Lab Results   Component Value Date    A1C 6.7 02/23/2019     ASSESSMENT/PLAN:  Urinary Tract Infection with Hematuria/Urinary Retention. > 100,000 col/ml Enterococcus faecalis on 2/22/19. Continue Amoxicillin as ordered x 6 days to complete antibiotic course. Follow-up with Dr. Frost on 3/15/19 for catheter removal. Cystoscopy also to be performed outpatient. Physical and Occupational Therapy ordered for deconditioning. Son states patient will be staying at facility long term.     Abnormal CT Urogram with History of Prostate Cancer S/P Orchiectomy. CT Urogram on 2/25/19 revealed \"patchy sclerotic densities in the iliac bone concerning for metastatic disease\". Follow-up with Dr. De La Cruz on 3/21/19 as scheduled. Bone scan to be performed at that time. Further recommendations pending bone scan results.    Progressive Weakness. May be related to above. Physical and Occupational Therapy ordered.  to assist with appropriate discharge disposition.     Coronary Artery Disease S/P Multiple Stent Placement/Sick Sinus Syndrome S/P Dual-Chamber Pacemaker with Upgrade to Biventricular ICD placement in 2013/Chronic Atrial Fibrillation on Anticoagulation/Cardiomyopathy. Followed by [facility]. EF 55% on 2/23/19. Received IV diuresis during hospitalization. Weight 207 lbs at hospital discharge. Monitor blood pressure weekly. Continue Atorvastatin, Lisinopril and Metoprolol as ordered. Re-initiated " on Furosemide. PTA dose of Warfarin 2.5 mg daily. Will repeat INR on 3/5/19 to ensure stability.    Type 2 Diabetes Mellitus. A1C 6.7 on 2/23/19. Given age and co-morbidities, no treatment indicated as goal A1C is < 8. Will not order routine blood sugars. Monitor blood sugars PRN if symptomatic.      History of Skin Cancer. Seen by Derm on 11/28/18 for actinic keratosis. Biopsy positive for SCC in situ x 2. Mohs procedure scheduled, but patient rescheduled. Does not appear to have followed up. Will need to discuss at next visit.      Cervicalgia. Acetaminophen available as needed.     Total time spent with patient visit at the skilled nursing facility was 35 min including patient visit, review of past records and phone call to patient contact. Greater than 50% of total time spent with counseling and coordinating care due to review of past medical history, review of hospitalization and discussion with patient and son    Electronically signed by:  MATTIE Baron CNP

## 2019-03-02 PROBLEM — E11.9 DIABETES MELLITUS, TYPE 2 (H): Status: ACTIVE | Noted: 2019-01-01

## 2019-03-06 NOTE — PROGRESS NOTES
[facility] [facility]  [place] Medical Record Number:  [id number]  Place of Service where encounter took place:  [facility] ([facility]) [[id number]]  Chief Complaint   Patient presents with     Nursing Home Acute     HPI:    Bret Merino  is a 89 year old (6/17/1925), who is being seen today for an episodic care visit.  HPI information obtained from: facility chart records, facility staff, patient report and [facility] chart review. Today's concern is:    Chronic Atrial Fibrillation on Anticoagulation. Today, INR 7.8. Previous INR 2.20 on 2/26/19. Taking Amoxicillin for Enterococcus faecalis UTI until 3/4/19.     Cardiomyopathy. Denies shortness of breath. Upon review of weights since admission to facility, 207 lbs on 2/26/19 -> 194 lbs on 3/5/19.     History of Skin Cancer. Discussed Dermatology visit prior to hospitalization. Patient states he was planning on making follow-up appointment for Mohs procedure, but then he became sick and ended up in the hospital. Does not want to go out of facility now. Will wait to arrange follow-up appointment.     Past Medical and Surgical History reviewed in [facility] today.    REVIEW OF SYSTEMS:  4 point ROS including Respiratory, CV, GI and , other than that noted in the HPI,  is negative    Physical Exam:  /64   Pulse 76   Temp 96  F (35.6  C)   Resp 16   Wt 88 kg (194 lb)   SpO2 96%   BMI 26.31 kg/m    GENERAL APPEARANCE:  Alert, in no distress  ENT:  Mouth and posterior oropharynx normal, moist mucous membranes  EYES:  EOM, conjunctivae, lids, pupils and irises normal  RESP:  respiratory effort and palpation of chest normal, lungs clear to auscultation , no respiratory distress  CV:  Palpation and auscultation of heart done , regular rate and rhythm, no murmur, rub, or gallop  ABDOMEN:  normal bowel sounds, soft, nontender, no hepatosplenomegaly or other masses  M/S:   Active movement of bilateral upper and lower extremities. No edema.  SKIN:  Few  scabs present on top of head  NEURO:   Cranial nerves 2-12 are normal tested and grossly at patient's baseline  PSYCH:  affect and mood normal    Labs:   Last Complete Blood Count:  Lab Results   Component Value Date    WBC 5.8 02/23/2019     Lab Results   Component Value Date    RBC 3.22 02/23/2019     Lab Results   Component Value Date    HGB 11.0 02/24/2019     Lab Results   Component Value Date    HCT 29.9 02/23/2019     Lab Results   Component Value Date    MCV 93 02/23/2019     Lab Results   Component Value Date    MCH 30.7 02/23/2019     Lab Results   Component Value Date    MCHC 33.1 02/23/2019     Lab Results   Component Value Date    RDW 14.2 02/23/2019     Lab Results   Component Value Date     02/23/2019     Last Comprehensive Metabolic Panel:  Sodium   Date Value Ref Range Status   02/26/2019 138 133 - 144 mmol/L Final     Potassium   Date Value Ref Range Status   02/26/2019 4.0 3.4 - 5.3 mmol/L Final     Chloride   Date Value Ref Range Status   02/26/2019 106 94 - 109 mmol/L Final     Carbon Dioxide   Date Value Ref Range Status   02/26/2019 24 20 - 32 mmol/L Final     Anion Gap   Date Value Ref Range Status   02/26/2019 8 3 - 14 mmol/L Final     Glucose   Date Value Ref Range Status   02/26/2019 129 (H) 70 - 99 mg/dL Final     Urea Nitrogen   Date Value Ref Range Status   02/26/2019 23 7 - 30 mg/dL Final     Creatinine   Date Value Ref Range Status   02/26/2019 0.86 0.66 - 1.25 mg/dL Final     GFR Estimate   Date Value Ref Range Status   02/26/2019 74 >60 mL/min/[1.73_m2] Final     Comment:     Non  GFR Calc  Starting 12/18/2018, serum creatinine based estimated GFR (eGFR) will be   calculated using the Chronic Kidney Disease Epidemiology Collaboration   (CKD-EPI) equation.       Calcium   Date Value Ref Range Status   02/26/2019 8.0 (L) 8.5 - 10.1 mg/dL Final     ASSESSMENT/PLAN:  Chronic Atrial Fibrillation on Anticoagulation. INR supratherapeutic. Likely secondary to recent  antibiotic use. PTA Warfarin dose of 2.5 mg daily. Patient has had no falls since admission to facility. Will hold INR today and repeat INR on 3/7/19. If INR is not trending downward, consider initiation of Vitamin K. Taking Metoprolol.     Cardiomyopathy. EF 55% on 2/23/19. Weights trending down since 2/26/19, but would question accuracy of admission weight. Euvolemic on examination. Continue Furosemide, Lisinopril and Metoprolol as ordered. Daily weights.     History of Skin Cancer. Seen by Derm on 11/28/18 for actinic keratosis. Biopsy positive for SCC in situ x 2. Mohs procedure needs to be re-scheduled by patient. Patient is not interested in arranging follow-up at this time.     Electronically signed by:  MATTIE Baron CNP

## 2019-03-06 NOTE — LETTER
3/6/2019        RE: Bret Merino  [address]  [facility] [address]        [place] [facility]  [place] Medical Record Number:  [id number]  Place of Service where encounter took place:  [facility] ([facility]) [[id number]]  Chief Complaint   Patient presents with     Nursing Home Acute     HPI:    Bret Merino  is a 89 year old (6/17/1925), who is being seen today for an episodic care visit.  HPI information obtained from: facility chart records, facility staff, patient report and [facility] chart review. Today's concern is:    Chronic Atrial Fibrillation on Anticoagulation. Today, INR 7.8. Previous INR 2.20 on 2/26/19. Taking Amoxicillin for Enterococcus faecalis UTI until 3/4/19.     Cardiomyopathy. Denies shortness of breath. Upon review of weights since admission to facility, 207 lbs on 2/26/19 -> 194 lbs on 3/5/19.     History of Skin Cancer. Discussed Dermatology visit prior to hospitalization. Patient states he was planning on making follow-up appointment for Mohs procedure, but then he became sick and ended up in the hospital. Does not want to go out of facility now. Will wait to arrange follow-up appointment.     Past Medical and Surgical History reviewed in Epic today.    REVIEW OF SYSTEMS:  4 point ROS including Respiratory, CV, GI and , other than that noted in the HPI,  is negative    Physical Exam:  /64   Pulse 76   Temp 96  F (35.6  C)   Resp 16   Wt 88 kg (194 lb)   SpO2 96%   BMI 26.31 kg/m     GENERAL APPEARANCE:  Alert, in no distress  ENT:  Mouth and posterior oropharynx normal, moist mucous membranes  EYES:  EOM, conjunctivae, lids, pupils and irises normal  RESP:  respiratory effort and palpation of chest normal, lungs clear to auscultation , no respiratory distress  CV:  Palpation and auscultation of heart done , regular rate and rhythm, no murmur, rub, or gallop  ABDOMEN:  normal bowel sounds, soft, nontender, no hepatosplenomegaly or other  masses  M/S:   Active movement of bilateral upper and lower extremities. No edema.  SKIN:   Few scabs present on top of head  NEURO:   Cranial nerves 2-12 are normal tested and grossly at patient's baseline  PSYCH:  affect and mood normal    Labs:   Last Complete Blood Count:  Lab Results   Component Value Date    WBC 5.8 02/23/2019     Lab Results   Component Value Date    RBC 3.22 02/23/2019     Lab Results   Component Value Date    HGB 11.0 02/24/2019     Lab Results   Component Value Date    HCT 29.9 02/23/2019     Lab Results   Component Value Date    MCV 93 02/23/2019     Lab Results   Component Value Date    MCH 30.7 02/23/2019     Lab Results   Component Value Date    MCHC 33.1 02/23/2019     Lab Results   Component Value Date    RDW 14.2 02/23/2019     Lab Results   Component Value Date     02/23/2019     Last Comprehensive Metabolic Panel:  Sodium   Date Value Ref Range Status   02/26/2019 138 133 - 144 mmol/L Final     Potassium   Date Value Ref Range Status   02/26/2019 4.0 3.4 - 5.3 mmol/L Final     Chloride   Date Value Ref Range Status   02/26/2019 106 94 - 109 mmol/L Final     Carbon Dioxide   Date Value Ref Range Status   02/26/2019 24 20 - 32 mmol/L Final     Anion Gap   Date Value Ref Range Status   02/26/2019 8 3 - 14 mmol/L Final     Glucose   Date Value Ref Range Status   02/26/2019 129 (H) 70 - 99 mg/dL Final     Urea Nitrogen   Date Value Ref Range Status   02/26/2019 23 7 - 30 mg/dL Final     Creatinine   Date Value Ref Range Status   02/26/2019 0.86 0.66 - 1.25 mg/dL Final     GFR Estimate   Date Value Ref Range Status   02/26/2019 74 >60 mL/min/[1.73_m2] Final     Comment:     Non  GFR Calc  Starting 12/18/2018, serum creatinine based estimated GFR (eGFR) will be   calculated using the Chronic Kidney Disease Epidemiology Collaboration   (CKD-EPI) equation.       Calcium   Date Value Ref Range Status   02/26/2019 8.0 (L) 8.5 - 10.1 mg/dL Final      ASSESSMENT/PLAN:  Chronic Atrial Fibrillation on Anticoagulation. INR supratherapeutic. Likely secondary to recent antibiotic use. PTA Warfarin dose of 2.5 mg daily. Patient has had no falls since admission to facility. Will hold INR today and repeat INR on 3/7/19. If INR is not trending downward, consider initiation of Vitamin K. Taking Metoprolol.     Cardiomyopathy. EF 55% on 2/23/19. Weights trending down since 2/26/19, but would question accuracy of admission weight. Euvolemic on examination. Continue Furosemide, Lisinopril and Metoprolol as ordered. Daily weights.     History of Skin Cancer. Seen by Derm on 11/28/18 for actinic keratosis. Biopsy positive for SCC in situ x 2. Mohs procedure needs to be re-scheduled by patient. Patient is not interested in arranging follow-up at this time.     Electronically signed by:  MATTIE Baron CNP               Sincerely,        MATTIE Baron CNP

## 2019-03-07 NOTE — TELEPHONE ENCOUNTER
Spoke to facility staff via telephone. INR 7.6 today. Previous INR 7.8 on 3/6/19. Warfarin held on 3/6/19. Previous Warfarin dose 2.5 mg daily. Taking Amoxicillin for UTI until 3/4/19.    Given age, risk of falls and concern for metastatic disease and minimal decrease in INR while holding Warfarin, will order Vitamin K 2.5 mg PO x 1. Hold Warfarin. Repeat INR on 3/8/19.    Spoke to son, Len, to review plan of care. Son reports INR has never been elevated since it was started. Educated on likely effect of antibiotic use and recent infection.     Also discussed Dermatology follow-up. Son states Mohs procedures are performed early in the morning and his father does not want to go out for that. Also questioning if the cook catheter can remain in place. Educated on risk of infection. Encouraged to discuss further at Urology appointment 3/15/19.

## 2019-03-08 NOTE — PROGRESS NOTES
[facility]  INITIAL VISIT NOTE  March 8, 2019    PRIMARY CARE PROVIDER AND CLINIC:  Ad [facility] [address] [facility] [id number]    Chief Complaint   Patient presents with     Hospital F/U     HPI:    Bret Merion is a 89 year old  (6/17/1925) male who was seen at [facility] on March 8, 2019 for an initial visit. Medical history is notable for CAD, ICM s/p AICD (2013), atrial fibrillation and remote prostate cancer s/p orchiectomy. He was hospitalized at [facility] from 2/22/19 to 2/26/19 where he presented with increasing weakness. Imaging with sclerotic densities in iliac bones concerning for metastatic prostate cancer. Clinically with mild decompensated CHF. TTE with EF 55% and he was diuresed and discharged on furosmeide. Also found to have an Enterococcus UTI and urinary retention and was discharged with a Morales in place. Labs notable for Hgb A1c 6.7 and he was given a diagnosis of DM II. He was admitted to this facility for medical management and rehab.     Today, Mr. Merino is seen in his room. PT was just about to take him to the gym for a session. He was able to get up from side of the bed and ambulate with his walker to the wheelchair and then sit down. Endorses feeling weak, but otherwise OK. No chest pain or dyspnea. No abdominal pain. No diarrhea or constipation.     CODE STATUS:   DNR / DNI    ALLERGIES:     Allergies   Allergen Reactions     Aleve [Naproxen]        PAST MEDICAL HISTORY:   Past Medical History:   Diagnosis Date     A-fib (H)      CAD (coronary artery disease)      CHF (congestive heart failure) (H)      Dyslipidemia      History of prediabetes      Hypertension      Pneumonia 01/2018     Prostate cancer (H)      Skin cancer        PAST SURGICAL HISTORY:   Past Surgical History:   Procedure Laterality Date     ARTHROPLASTY KNEE BILATERAL       ARTHROPLASTY SHOULDER Right      AS TOTAL HIP ARTHROPLASTY Right      H  CORONARY INTERVENTION      stentin for CAD     IMPLANT PACEMAKER       ORCHIECTOMY SCROTAL BILATERAL      treatment of prostate cancer       FAMILY HISTORY:   Reviewed and non-contributory    SOCIAL HISTORY:   Lives alone     MEDICATIONS:  Current Outpatient Medications   Medication Sig Dispense Refill     acetaminophen (TYLENOL) 325 MG tablet Take 650 mg by mouth every 6 hours as needed for mild pain       ASPIRIN NOT PRESCRIBED (INTENTIONAL) Please choose reason not prescribed, below 0 each 0     atorvastatin (LIPITOR) 20 MG tablet Take 20 mg by mouth At Bedtime        furosemide (LASIX) 40 MG tablet Take 1 tablet (40 mg) by mouth every morning 30 tablet      lisinopril (PRINIVIL/ZESTRIL) 10 MG tablet Take 10 mg by mouth At Bedtime        metoprolol succinate (TOPROL-XL) 25 MG 24 hr tablet Take 25 mg by mouth At Bedtime        Warfarin Sodium (COUMADIN PO) Take by mouth daily Take as directed per INR results           ROS:  10 point ROS neg other than the symptoms noted above in the HPI    PHYSICAL EXAM:  /66   Pulse 70   Temp 97  F (36.1  C)   Resp 18   Wt 88 kg (194 lb)   SpO2 97%   BMI 26.31 kg/m     Gen: sitting in wheelchair, alert, cooperative and in no acute distress  HEENT: normocephalic; oropharynx clear  Card: RRR, S1, S2, no murmurs  Resp: lungs clear to auscultation bilaterally, no crackles or wheezes  GI: abdomen soft, not-tender  : Morales draining clear yellow urine  MSK: normal muscle tone, some dependent LE edema  Neuro: CX II-XII grossly in tact; ROM in all four extremities grossly in tact  Psych: alert and oriented x3; normal affect    LABORATORY/IMAGING DATA:  Reviewed as per Epic    ASSESSMENT/PLAN:    Sclerotic Bone Lesions --> ?Metastatic Prostate Cancer  History of remote prostate cancer and imaging with concern for metastatic disease. PSA 59.   -- follow up with Dr. De La Cruz (med onc) and Dr IKE Frost (urology) as scheduled     Urinary Retention   In setting of UTI and  ?metastatic prostate cancer. Morales placed and remains in place  -- routine Morales cares  -- follow up with urology as scheduled     Enterococcus UTI  Completed course of amoxicillin on 3/4. Afebrile. Urine clear.   -- follow clinically     CAD / Ischemic Cardiomyopathy s/p AICD (2013) / HTN / HLD  SBPs 110s-120s. Weights stable around 194 lbs. TTE during hospitalization with EF 55%. New on furosemide.   -- continues on atorvastatin 20 mg at bedtime, furosemide 40 mg daily, lisinopril 10 mg at bedtime and metoprolol XL 25 mg at bedtime  -- follow BPs, weights, clinical volume status    Chronic Atrial Fibrillation  HR 60s-80s. Had a supratherapeutic INR yesterday and received Vit K. INR 4.4 today  -- rate controlled with metoprolol XL 25 mg at bedtime   -- hold warfarin 3/8 and 3/9  -- next INR 3/10 - if close to or <3 would go ahead and restart at 2.5 mg daily with INR on 3/13    DM, Type II - NEW Diagnosis  Hgb A1c 6.7. Goal A1c is <8  -- diet control  -- could consider recheck A1c in 3 mos per PCP    Physical Deconditioning  In setting of hospitalization and underlying medical conditions  -- ongoing PT/OT      Electronically signed by:  Kym Buchanan MD

## 2019-03-15 NOTE — TELEPHONE ENCOUNTER
INR 1.6; down from 1.8 - received 2mg - ordered Coumadin 2.5mg PO qDay recheck INR on Monday.    MATTIE Guerra, [facility]  Phone: [phone]  Fax: [phone]

## 2019-03-19 NOTE — PROGRESS NOTES
[facility]  [place] Medical Record Number:  [id number]  Place of Service where encounter took place:  [facility] ([facility]) [[id number]]  Chief Complaint   Patient presents with     Nursing Home Acute     HPI:    Bret Merino  is a 89 year old (6/17/1925), who is being seen today for an episodic care visit.  HPI information obtained from: facility chart records, facility staff, patient report and [facility] chart review. Today's concern is:    Chronic Atrial Fibrillation on Anticoagulation. INR 2.2 on 3/18/19. Previous INR 1.8 on 3/15/19 per [facility] and 1.8 on 3/13/19. Taking Warfarin 2.5 mg PO daily.     Urinary Retention. Seen by Dr. Noel on 3/15/19. Per review of documentation, cook catheter changed. Recommended cook catheter change every 4 weeks which can be completed at the nursing home. Urinary retention was thought to be due to prostate cancer. Today, patient reports no other issues. No hematuria documented by staff.     Metastatic Prostate Cancer. Per review of [facility], Oncology appointment cancelled on 3/12/19 as family noted they did not want to arrange follow-up. Per review of Urology appointment 3/15/19 documentation, discussed additional treatment for metastatic prostate cancer and patient/family declined. Today, patient denies pain.     Past Medical and Surgical History reviewed in Epic today.    MEDICATIONS:  Current Outpatient Medications   Medication Sig Dispense Refill     acetaminophen (TYLENOL) 325 MG tablet Take 650 mg by mouth every 6 hours as needed for mild pain       ASPIRIN NOT PRESCRIBED (INTENTIONAL) Please choose reason not prescribed, below 0 each 0     atorvastatin (LIPITOR) 20 MG tablet Take 20 mg by mouth At Bedtime        furosemide (LASIX) 40 MG tablet Take 1 tablet (40 mg) by mouth every morning 30 tablet      lisinopril (PRINIVIL/ZESTRIL) 10 MG tablet Take 10 mg by mouth At Bedtime        metoprolol succinate (TOPROL-XL) 25 MG 24 hr tablet  Take 25 mg by mouth At Bedtime        Warfarin Sodium (COUMADIN PO) Take by mouth daily Take as directed per INR results       REVIEW OF SYSTEMS:  4 point ROS including Respiratory, CV, GI and , other than that noted in the HPI,  is negative    Objective:  /64   Pulse 70   Temp 97.8  F (36.6  C)   Resp 18   Wt 87.9 kg (193 lb 12.8 oz)   SpO2 98%   BMI 26.28 kg/m    Exam:INR th  GENERAL APPEARANCE:  Alert, in no distress  ENT:  Mouth and posterior oropharynx normal, moist mucous membranes  EYES:  EOM, conjunctivae, lids, pupils and irises normal  RESP:  respiratory effort and palpation of chest normal, lungs clear to auscultation , no respiratory distress  CV:  Palpation and auscultation of heart done , regular rate and rhythm, no murmur, rub, or gallop  ABDOMEN:  normal bowel sounds, soft, nontender, no hepatosplenomegaly or other masses  M/S:   Active movement of bilateral upper and lower extremities. No edema.  SKIN:  Abrasion on top of head  NEURO:   Cranial nerves 2-12 are normal tested and grossly at patient's baseline  PSYCH:  affect and mood normal     Labs:   Labs done in SNF are in [facility] EPIC. Please refer to them using [facility] Everywhere.    ASSESSMENT/PLAN:  Chronic Atrial Fibrillation on Anticoagulation. INR therapeutic. Continue Warfarin 2.5 mg daily as ordered. Recheck INR on 3/21/19.     Urinary Retention. Thought to be secondary to prostate cancer. Seen by Dr. Noel on 31/15/19. Morales catheter changed with instructions to change every 4 weeks. Morales catheter change can be performed at the nursing home. Follow-up with Urology PRN.     Metastatic Prostate Cancer S/P Bilateral Orchiectomy. Discussed additional treatment for metastatic prostate cancer at Urology appointment noted above. Family and patient not interested in further treatment. Cancelled appointment with Oncology scheduled for 3/21/19.     Electronically signed by:  MATTIE Baron CNP

## 2019-03-26 NOTE — LETTER
3/26/2019        RE: Bret Merino  [address]  [facility] [facility] [address]        [facility]  [place] Medical Record Number:  [id number]  Place of Service where encounter took place: [facility] ([facility]) [[id number]]    HPI:    Bret Merino is a 89 year old  (6/17/1925), who is being seen today for an episodic care visit at the above location.   HPI information obtained from: facility chart records, facility staff, patient report and [facility] chart review. Today's concern is INR/Coumadin management for A. Fib    ROS/Subjective:  Bleeding Signs/Symptoms:  None  Thromboembolic Signs/Symptoms:  None  Medication Changes:  No  Dietary Changes:  No  Activity Changes: No  Bacterial/Viral Infection:  No  Missed Coumadin Doses:  This week - 3/25/19  On ASA: No  Other Concerns:  No    OBJECTIVE:  /68   Pulse 72   Temp 97.1  F (36.2  C)   Resp 18   Wt 87.2 kg (192 lb 4.8 oz)   SpO2 97%   BMI 26.08 kg/m     Last INR: 3.9 on 3/25/19  INR Today:  3.3  Current Dose:  On hold    ASSESSMENT:  Chronic Atrial Fibrillation on Anticoagulation.  Supratherapeutic INR for goal of 2-3    PLAN:  New Dose: Warfarin 1 mg PO daily    Next INR: 3/28/19    Electronically signed by:  MATTIE Baron CNP       Sincerely,        MATTIE Baron CNP

## 2019-04-02 NOTE — LETTER
"    4/2/2019        RE: Bret Merino  [address]  [facility] [address]        [place] [facility]  Chief Complaint   Patient presents with     senior living Regulatory     [place] Medical Record Number:  [id number]  Place of Service where encounter took place:  [facility] ([facility]) [[id number]]    HPI:    Bret Merino  is 89 year old (6/17/1925), who is being seen today for a federally mandated E/M visit.  HPI information obtained from: facility chart records, facility staff, patient report and [facility] chart review. Today's concerns are:    Counseling Regarding Advanced Directives. Reviewed goals of care with patient. Confirmed DNR/DNI status. Would be ok with a CPAP or BiPAP. Would want to go to the hospital if indicated. Would not want a feeding tube. Ok with antibiotics. States \"whatever will be, will be. Let nature take its course\".    Insomnia. Complains of staying awake at night. Is not waking up due to pain. Has catheter so is not urinating at night. Spends a lot of the day in bed, but is not necessarily sleeping. Per review of facility documentation, no issues with sleep noted.     Chronic Atrial Fibrillation on Anticoagulation. INR 1.7 on 4/1/19. Previous INRs have been as follow:    INR 3.0 on 3/28/19 Warfarin 0.5 mg PO daily   INR 3.3 on 3/26/19 Warfarin 1 mg PO daily  INR 3.9 on 3/25/19 Warfarin held     ALLERGIES:Naproxen  PAST MEDICAL HISTORY:   has a past medical history of A-fib (H), CAD (coronary artery disease), CHF (congestive heart failure) (H), Dyslipidemia, History of prediabetes, Hypertension, Pneumonia (01/2018), Prostate cancer (H), and Skin cancer.  PAST SURGICAL HISTORY:   has a past surgical history that includes Orchiectomy scrotal bilateral; H CORONARY INTERVENTION; Implant pacemaker; Arthroplasty knee bilateral; TOTAL HIP ARTHROPLASTY (Right); and Arthroplasty shoulder (Right).  FAMILY HISTORY: family history is not on file.  SOCIAL HISTORY:  reports " that  has never smoked. he has never used smokeless tobacco. He reports that he does not drink alcohol or use drugs.    MEDICATIONS:  Current Outpatient Medications   Medication Sig Dispense Refill     acetaminophen (TYLENOL) 325 MG tablet Take 650 mg by mouth every 6 hours as needed for mild pain       ASPIRIN NOT PRESCRIBED (INTENTIONAL) Please choose reason not prescribed, below 0 each 0     atorvastatin (LIPITOR) 20 MG tablet Take 20 mg by mouth At Bedtime        furosemide (LASIX) 40 MG tablet Take 1 tablet (40 mg) by mouth every morning 30 tablet      lisinopril (PRINIVIL/ZESTRIL) 10 MG tablet Take 10 mg by mouth At Bedtime        metoprolol succinate (TOPROL-XL) 25 MG 24 hr tablet Take 25 mg by mouth At Bedtime        Warfarin Sodium (COUMADIN PO) Take by mouth daily Take as directed per INR results       Case Management:  I have reviewed the care plan and MDS and do agree with the plan. Patient's desire to return to the community is present, but is not able due to care needs . Information reviewed:  Medications, vital signs, orders, and nursing notes.    ROS:  4 point ROS including Respiratory, CV, GI and , other than that noted in the HPI,  is negative    Vitals:  /56   Pulse 64   Temp 98.2  F (36.8  C)   Resp 18   Wt 87.6 kg (193 lb 1.6 oz)   SpO2 97%   BMI 26.19 kg/m     Body mass index is 26.19 kg/m .  Exam:  GENERAL APPEARANCE:  Alert, in no distress  ENT:  Mouth and posterior oropharynx normal, moist mucous membranes  EYES:  EOM, conjunctivae, lids, pupils and irises normal  RESP:  respiratory effort and palpation of chest normal, lungs clear to auscultation , no respiratory distress  CV:  Palpation and auscultation of heart done , regular rate and rhythm, no murmur, rub, or gallop  ABDOMEN:  normal bowel sounds, soft, nontender, no hepatosplenomegaly or other masses  M/S:   Active movement of bilateral upper and lower extremities.1+ edema in BLE.  SKIN:  Abrasion on top of  head  NEURO:   Cranial nerves 2-12 are normal tested and grossly at patient's baseline  PSYCH:  affect and mood normal     Lab/Diagnostic data:   Labs done in SNF are in [facility]. Please refer to them using [employer]/Care Everywhere.    ASSESSMENT/PLAN  Counseling Regarding Advanced Directives. Confirmed DNR/DNI status. Ok with antibiotics. Ok with hospitalization. No tube feedings. Patient signed POLST form. Faxed POLST form to be scanned into Epic. Of note, previously reviewed code status with son on 2/28/19 visit.     Insomnia. Per patient report. No documentation per staff. Patient reports he is not interested in starting any medications as this is not a new issue. Continue to monitor.     Chronic Atrial Fibrillation on Anticoagulation. INR now slightly subtherapeutic. Will ordered Warfarin 1 mg daily. Recheck INR on 4/4/19. Taking Metoprolol.     Electronically signed by:  MATTIE Baron CNP      Sincerely,        MATTIE Baron CNP

## 2019-04-02 NOTE — PROGRESS NOTES
"[place] [facility]  Chief Complaint   Patient presents with     intermediate Regulatory     [place] Medical Record Number:  [id number]  Place of Service where encounter took place:  [facility] ([facility] [[id number]]    HPI:    Bret Merino  is 89 year old (6/17/1925), who is being seen today for a federally mandated E/M visit.  HPI information obtained from: facility chart records, facility staff, patient report and [facility] chart review. Today's concerns are:    Counseling Regarding Advanced Directives. Reviewed goals of care with patient. Confirmed DNR/DNI status. Would be ok with a CPAP or BiPAP. Would want to go to the hospital if indicated. Would not want a feeding tube. Ok with antibiotics. States \"whatever will be, will be. Let nature take its course\".    Insomnia. Complains of staying awake at night. Is not waking up due to pain. Has catheter so is not urinating at night. Spends a lot of the day in bed, but is not necessarily sleeping. Per review of facility documentation, no issues with sleep noted.     Chronic Atrial Fibrillation on Anticoagulation. INR 1.7 on 4/1/19. Previous INRs have been as follow:    INR 3.0 on 3/28/19 Warfarin 0.5 mg PO daily   INR 3.3 on 3/26/19 Warfarin 1 mg PO daily  INR 3.9 on 3/25/19 Warfarin held     ALLERGIES:Naproxen  PAST MEDICAL HISTORY:   has a past medical history of A-fib (H), CAD (coronary artery disease), CHF (congestive heart failure) (H), Dyslipidemia, History of prediabetes, Hypertension, Pneumonia (01/2018), Prostate cancer (H), and Skin cancer.  PAST SURGICAL HISTORY:   has a past surgical history that includes Orchiectomy scrotal bilateral; H CORONARY INTERVENTION; Implant pacemaker; Arthroplasty knee bilateral; TOTAL HIP ARTHROPLASTY (Right); and Arthroplasty shoulder (Right).  FAMILY HISTORY: family history is not on file.  SOCIAL HISTORY:  reports that  has never smoked. he has never used smokeless tobacco. He reports that he does not " drink alcohol or use drugs.    MEDICATIONS:  Current Outpatient Medications   Medication Sig Dispense Refill     acetaminophen (TYLENOL) 325 MG tablet Take 650 mg by mouth every 6 hours as needed for mild pain       ASPIRIN NOT PRESCRIBED (INTENTIONAL) Please choose reason not prescribed, below 0 each 0     atorvastatin (LIPITOR) 20 MG tablet Take 20 mg by mouth At Bedtime        furosemide (LASIX) 40 MG tablet Take 1 tablet (40 mg) by mouth every morning 30 tablet      lisinopril (PRINIVIL/ZESTRIL) 10 MG tablet Take 10 mg by mouth At Bedtime        metoprolol succinate (TOPROL-XL) 25 MG 24 hr tablet Take 25 mg by mouth At Bedtime        Warfarin Sodium (COUMADIN PO) Take by mouth daily Take as directed per INR results       Case Management:  I have reviewed the care plan and MDS and do agree with the plan. Patient's desire to return to the community is present, but is not able due to care needs . Information reviewed:  Medications, vital signs, orders, and nursing notes.    ROS:  4 point ROS including Respiratory, CV, GI and , other than that noted in the HPI,  is negative    Vitals:  /56   Pulse 64   Temp 98.2  F (36.8  C)   Resp 18   Wt 87.6 kg (193 lb 1.6 oz)   SpO2 97%   BMI 26.19 kg/m    Body mass index is 26.19 kg/m .  Exam:  GENERAL APPEARANCE:  Alert, in no distress  ENT:  Mouth and posterior oropharynx normal, moist mucous membranes  EYES:  EOM, conjunctivae, lids, pupils and irises normal  RESP:  respiratory effort and palpation of chest normal, lungs clear to auscultation , no respiratory distress  CV:  Palpation and auscultation of heart done , regular rate and rhythm, no murmur, rub, or gallop  ABDOMEN:  normal bowel sounds, soft, nontender, no hepatosplenomegaly or other masses  M/S:   Active movement of bilateral upper and lower extremities.1+ edema in BLE.  SKIN:  Abrasion on top of head  NEURO:   Cranial nerves 2-12 are normal tested and grossly at patient's baseline  PSYCH:  affect  and mood normal     Lab/Diagnostic data:   Labs done in SNF are in [place] EPIC. Please refer to them using [employer]/Care Everywhere.    ASSESSMENT/PLAN  Counseling Regarding Advanced Directives. Confirmed DNR/DNI status. Ok with antibiotics. Ok with hospitalization. No tube feedings. Patient signed POLST form. Faxed POLST form to be scanned into Epic. Of note, previously reviewed code status with son on 2/28/19 visit.     Insomnia. Per patient report. No documentation per staff. Patient reports he is not interested in starting any medications as this is not a new issue. Continue to monitor.     Chronic Atrial Fibrillation on Anticoagulation. INR now slightly subtherapeutic. Will ordered Warfarin 1 mg daily. Recheck INR on 4/4/19. Taking Metoprolol.     Electronically signed by:  MATTIE Baron CNP

## 2019-04-11 NOTE — TELEPHONE ENCOUNTER
RN called to report INR today was 2.1  Reports was getting Warfarin 1.5 mg q daily, when last INR was 1.4 on 4/4  Reports no bleeding, VSS.     Orders:  -Warfarin continues 1.5 mg's q daily.   -Next INR 16 April.

## 2019-04-16 NOTE — LETTER
4/16/2019        RE: Bret Merino  [address]  [facility] [address]        [place] [facility]  [place] Medical Record Number:  [id number]  Place of Service where encounter took place:  [facility] ([facility]) [[id number]]  Chief Complaint   Patient presents with     Nursing Home Acute     HPI:    Bret Merino  is a 89 year old (6/17/1925), who is being seen today for an episodic care visit.  HPI information obtained from: facility chart records, facility staff, patient report and [facility] chart review. Today's concern is:    Chronic Atrial Fibrillation on Anticoagulation. INR 1.8 on 4/16/19. Previous INRs have been as follows:    INR 2.1 on 4/11/19 Warfarin 1.5 mg daily  INR 1.4 on 4/4/19 Warfarin 1.5 mg PO daily  INR 1.7 on 4/1/19 Warfarin 1 mg PO daily  INR 3.0 on 3/28/19 Warfarin 0.5 mg PO daily   INR 3.3 on 3/26/19 Warfarin 1 mg PO daily  INR 3.9 on 3/25/19 Warfarin held     Over the past week, heart rate primarily in the 60s.     Hematuria. Received report from staff on 4/10/19 patient had blood in his urine. Requested staff update Urology. On 4/15/19, hematuria was noted, wrong provider was updated, catheter was changed and flushed. Encourage to drink more fluids. Today, received call from staff patient again had hematuria. Did not hear back from Urology after leaving a message. Also reports cook catheter did not drain after it was changed on 4/15/19, but this morning, was noted to have a large amount of urine in his drainage bag. Per patient report, denies any issues with urination.      Cardiomyopathy. No reports of shortness of breath. Upon review of weights since admission to facility, 207 lbs on 2/26/19 -> 193.7 lbs on 3/11/19 -> 193.1 lbs on 4/1/19 -> 194.1 lbs on 4/16/19.     Past Medical and Surgical History reviewed in Epic today.    MEDICATIONS:  Current Outpatient Medications   Medication Sig Dispense Refill     acetaminophen (TYLENOL) 325 MG tablet Take 650 mg by  mouth every 6 hours as needed for mild pain       ASPIRIN NOT PRESCRIBED (INTENTIONAL) Please choose reason not prescribed, below 0 each 0     atorvastatin (LIPITOR) 20 MG tablet Take 20 mg by mouth At Bedtime        furosemide (LASIX) 40 MG tablet Take 1 tablet (40 mg) by mouth every morning 30 tablet      lisinopril (PRINIVIL/ZESTRIL) 10 MG tablet Take 10 mg by mouth At Bedtime        metoprolol succinate (TOPROL-XL) 25 MG 24 hr tablet Take 25 mg by mouth At Bedtime        Warfarin Sodium (COUMADIN PO) Take by mouth daily Take as directed per INR results       REVIEW OF SYSTEMS:  4 point ROS including Respiratory, CV, GI and , other than that noted in the HPI,  is negative    Objective:  /71   Pulse 74   Temp 97  F (36.1  C)   Resp 18   Wt 88 kg (194 lb 1.6 oz)   SpO2 100%   BMI 26.32 kg/m     Exam:  GENERAL APPEARANCE:  Alert, in no distress  ENT:  Mouth and posterior oropharynx normal, moist mucous membranes  EYES:  EOM, conjunctivae, lids, pupils and irises normal  RESP:  respiratory effort and palpation of chest normal, lungs clear to auscultation , no respiratory distress  CV:  Palpation and auscultation of heart done , regular rate and rhythm, no murmur, rub, or gallop  ABDOMEN:  normal bowel sounds, soft, nontender, no hepatosplenomegaly or other masses  M/S:   Active movement of bilateral upper and lower extremities. No edema.  : Urine clear yellow in catheter tubing and dark red-to-brown in drainage bag  SKIN:  Inspection at baseline  NEURO:   Cranial nerves 2-12 are normal tested and grossly at patient's baseline  PSYCH:  affect and mood normal     Labs:   Labs done in SNF are in [place] EPIC. Please refer to them using [employer]/Care Everywhere.    ASSESSMENT/PLAN:  Chronic Atrial Fibrillation on Anticoagulation. INR slightly subtherapeutic. Will order Warfarin 2.5 mg PO daily. Repeat INR on 4/18/19. Continue Metoprolol as ordered.    Hematuria. Noted during hospitalization 2/22/19 through  2/26/19 while patient was being treated for a UTI. 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 Seen by Urology, Dr. Noel on 3/15/19. Family and patient declined work-up for metastatic prostate cancer. As urine draining in cook catheter tubing is clear yellow, no indication for intervention at this time. If hematuria were noted in the future, may consider UTI versus metastatic prostate cancer which patient has declined further work-up.     Cardiomyopathy. EF 55% on 2/23/19. Weights trending down slightly since admission to facility although would question accuracy of admission weight as this is 10 lbs > than weight 48 hours later. If weights remain stable, consider decreasing frequency of weights to weekly.     Electronically signed by:  MATTIE Baron CNP         Sincerely,        MATTIE Baron CNP

## 2019-04-16 NOTE — PROGRESS NOTES
[place] [facility]  [place] Medical Record Number:  [id number]  Place of Service where encounter took place:  [facility] ([facility] [[id number]]  Chief Complaint   Patient presents with     Nursing Home Acute     HPI:    Bret Merino  is a 89 year old (6/17/1925), who is being seen today for an episodic care visit.  HPI information obtained from: facility chart records, facility staff, patient report and [facility] chart review. Today's concern is:    Chronic Atrial Fibrillation on Anticoagulation. INR 1.8 on 4/16/19. Previous INRs have been as follows:    INR 2.1 on 4/11/19 Warfarin 1.5 mg daily  INR 1.4 on 4/4/19 Warfarin 1.5 mg PO daily  INR 1.7 on 4/1/19 Warfarin 1 mg PO daily  INR 3.0 on 3/28/19 Warfarin 0.5 mg PO daily   INR 3.3 on 3/26/19 Warfarin 1 mg PO daily  INR 3.9 on 3/25/19 Warfarin held     Over the past week, heart rate primarily in the 60s.     Hematuria. Received report from staff on 4/10/19 patient had blood in his urine. Requested staff update Urology. On 4/15/19, hematuria was noted, wrong provider was updated, catheter was changed and flushed. Encourage to drink more fluids. Today, received call from staff patient again had hematuria. Did not hear back from Urology after leaving a message. Also reports cook catheter did not drain after it was changed on 4/15/19, but this morning, was noted to have a large amount of urine in his drainage bag. Per patient report, denies any issues with urination.      Cardiomyopathy. No reports of shortness of breath. Upon review of weights since admission to facility, 207 lbs on 2/26/19 -> 193.7 lbs on 3/11/19 -> 193.1 lbs on 4/1/19 -> 194.1 lbs on 4/16/19.     Past Medical and Surgical History reviewed in Epic today.    MEDICATIONS:  Current Outpatient Medications   Medication Sig Dispense Refill     acetaminophen (TYLENOL) 325 MG tablet Take 650 mg by mouth every 6 hours as needed for mild pain       ASPIRIN NOT PRESCRIBED (INTENTIONAL)  Please choose reason not prescribed, below 0 each 0     atorvastatin (LIPITOR) 20 MG tablet Take 20 mg by mouth At Bedtime        furosemide (LASIX) 40 MG tablet Take 1 tablet (40 mg) by mouth every morning 30 tablet      lisinopril (PRINIVIL/ZESTRIL) 10 MG tablet Take 10 mg by mouth At Bedtime        metoprolol succinate (TOPROL-XL) 25 MG 24 hr tablet Take 25 mg by mouth At Bedtime        Warfarin Sodium (COUMADIN PO) Take by mouth daily Take as directed per INR results       REVIEW OF SYSTEMS:  4 point ROS including Respiratory, CV, GI and , other than that noted in the HPI,  is negative    Objective:  /71   Pulse 74   Temp 97  F (36.1  C)   Resp 18   Wt 88 kg (194 lb 1.6 oz)   SpO2 100%   BMI 26.32 kg/m    Exam:  GENERAL APPEARANCE:  Alert, in no distress  ENT:  Mouth and posterior oropharynx normal, moist mucous membranes  EYES:  EOM, conjunctivae, lids, pupils and irises normal  RESP:  respiratory effort and palpation of chest normal, lungs clear to auscultation , no respiratory distress  CV:  Palpation and auscultation of heart done , regular rate and rhythm, no murmur, rub, or gallop  ABDOMEN:  normal bowel sounds, soft, nontender, no hepatosplenomegaly or other masses  M/S:   Active movement of bilateral upper and lower extremities. No edema.  : Urine clear yellow in catheter tubing and dark red-to-brown in drainage bag  SKIN:  Inspection at baseline  NEURO:   Cranial nerves 2-12 are normal tested and grossly at patient's baseline  PSYCH:  affect and mood normal     Labs:   Labs done in SNF are in [facility]. Please refer to them using [employer]/Care Everywhere.    ASSESSMENT/PLAN:  Chronic Atrial Fibrillation on Anticoagulation. INR slightly subtherapeutic. Will order Warfarin 2.5 mg PO daily. Repeat INR on 4/18/19. Continue Metoprolol as ordered.    Hematuria. Noted during hospitalization 2/22/19 through 2/26/19 while patient was being treated for a UTI. 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 Seen by Urology, Dr. Noel on 3/15/19. Family and patient declined work-up for metastatic prostate cancer. As urine draining in cook catheter tubing is clear yellow, no indication for intervention at this time. If hematuria were noted in the future, may consider UTI versus metastatic prostate cancer which patient has declined further work-up.     Cardiomyopathy. EF 55% on 2/23/19. Weights trending down slightly since admission to facility although would question accuracy of admission weight as this is 10 lbs > than weight 48 hours later. If weights remain stable, consider decreasing frequency of weights to weekly.     Electronically signed by:  MATTIE Baron CNP

## 2019-04-23 NOTE — LETTER
4/23/2019        RE: Bret Merino  [address]  [facility] [address]        [place] [facility]  [place] Medical Record Number:  [id number]  Place of Service where encounter took place:  [facility] ([facility]) [[id number]]  Chief Complaint   Patient presents with     Nursing Home Acute     HPI:    Bret Merino  is a 89 year old (6/17/1925), who is being seen today for an episodic care visit.  HPI information obtained from: facility chart records, facility staff, patient report and [facility] chart review. Today's concern is:    Fall. On 4/18/19, found on the floor by staff. Laying on left side with head face down on the floor. Abrasion noted to the top of the head. Was trying to sit on the edge of the bed and lost balance, falling to the floor and hitting head. Neurological examination intact. Today, patient reports he was being careless and fell. Won't do it again. Denies pain, headache or changes in vision.     Chronic Atrial Fibrillation on Anticoagulation. Pulse primarily 60-70s over the past week. INR 2.6 on 4/22/19. Previous INRs are as follows:    INR 2.6 on 4/18/19. Warfarin 1 mg and 2 mg alternating every other day   INR 1.8 on 4/16/19 Warfarin 2.5 mg POdaily  INR 2.1 on 4/11/19 Warfarin 1.5 mg PO daily  INR 1.4 on 4/4/19 Warfarin 1.5 mg PO daily  INR 1.7 on 4/1/19 Warfarin 1 mg PO daily  INR 3.0 on 3/28/19 Warfarin 0.5 mg PO daily   INR 3.3 on 3/26/19 Warfarin 1 mg PO daily  INR 3.9 on 3/25/19 Warfarin held     Coronary Artery Disease S/P Multiple Stent Placements. Upon review of blood pressure over the past 5 days, systolic range from . Diastolic range from 41-62.    Past Medical and Surgical History reviewed in Epic today.    MEDICATIONS:  Current Outpatient Medications   Medication Sig Dispense Refill     acetaminophen (TYLENOL) 325 MG tablet Take 650 mg by mouth every 6 hours as needed for mild pain       ASPIRIN NOT PRESCRIBED (INTENTIONAL) Please choose reason  not prescribed, below 0 each 0     atorvastatin (LIPITOR) 20 MG tablet Take 20 mg by mouth At Bedtime        furosemide (LASIX) 40 MG tablet Take 1 tablet (40 mg) by mouth every morning 30 tablet      lisinopril (PRINIVIL/ZESTRIL) 10 MG tablet Take 10 mg by mouth At Bedtime        metoprolol succinate (TOPROL-XL) 25 MG 24 hr tablet Take 25 mg by mouth At Bedtime        Warfarin Sodium (COUMADIN PO) Take by mouth daily Take as directed per INR results       REVIEW OF SYSTEMS:  4 point ROS including Respiratory, CV, GI and , other than that noted in the HPI,  is negative    Objective:  /54   Pulse 60   Temp 98.4  F (36.9  C)   Resp 17   Wt 76 kg (167 lb 8 oz)   SpO2 98%   BMI 22.72 kg/m     Exam:  GENERAL APPEARANCE:  Alert, in no distress  ENT:  Mouth and posterior oropharynx normal, moist mucous membranes  EYES:  EOM, conjunctivae, lids, pupils and irises normal  RESP:  respiratory effort and palpation of chest normal, lungs clear to auscultation , no respiratory distress  CV:  Palpation and auscultation of heart done , regular rate and rhythm, no murmur, rub, or gallop  ABDOMEN:  normal bowel sounds, soft, nontender, no hepatosplenomegaly or other masses  M/S:   Active movement of bilateral upper and lower extremities. Trace edema in BLE.  SKIN: Dressing on top of head. Swelling noted in left upper eyelid.   NEURO:   Cranial nerves 2-12 are normal tested and grossly at patient's baseline  PSYCH:  affect and mood normal     Labs:   Labs done in SNF are in [place] EPIC. Please refer to them using [employer]/Care Everywhere.    ASSESSMENT/PLAN:  Fall. On 4/18/19 while attempting to reach forward from edge of bed, hitting head. Neuros intact. No headache. No changes in vision. Dressing applied to abrasions on top of head. Encouraged to request assistance from staff in the future if needed.     Chronic Atrial Fibrillation on Anticoagulation. INR therapeutic. Continue Warfarin 1 mg on M, W, F and 2 mg on all  other days. Repeat INR on 4/29/19 to ensure stability. Takes Metoprolol.     Coronary Artery Disease S/P Multiple Stent Placements. Most blood pressures < 140/90. Continue Lisinopril and Metoprolol as ordered. Also on Furosemide. Monitor blood pressure weekly.     Electronically signed by:  MATTIE Baron CNP             Sincerely,        MATTIE Baron CNP

## 2019-04-23 NOTE — PROGRESS NOTES
[facility]  [place] Medical Record Number:  [id number]  Place of Service where encounter took place:  [facility] ([facility]) [[id number]]  Chief Complaint   Patient presents with     Nursing Home Acute     HPI:    Bret Merino  is a 89 year old (6/17/1925), who is being seen today for an episodic care visit.  HPI information obtained from: facility chart records, facility staff, patient report and [facility] chart review. Today's concern is:    Fall. On 4/18/19, found on the floor by staff. Laying on left side with head face down on the floor. Abrasion noted to the top of the head. Was trying to sit on the edge of the bed and lost balance, falling to the floor and hitting head. Neurological examination intact. Today, patient reports he was being careless and fell. Won't do it again. Denies pain, headache or changes in vision.     Chronic Atrial Fibrillation on Anticoagulation. Pulse primarily 60-70s over the past week. INR 2.6 on 4/22/19. Previous INRs are as follows:    INR 2.6 on 4/18/19. Warfarin 1 mg and 2 mg alternating every other day   INR 1.8 on 4/16/19 Warfarin 2.5 mg POdaily  INR 2.1 on 4/11/19 Warfarin 1.5 mg PO daily  INR 1.4 on 4/4/19 Warfarin 1.5 mg PO daily  INR 1.7 on 4/1/19 Warfarin 1 mg PO daily  INR 3.0 on 3/28/19 Warfarin 0.5 mg PO daily   INR 3.3 on 3/26/19 Warfarin 1 mg PO daily  INR 3.9 on 3/25/19 Warfarin held     Coronary Artery Disease S/P Multiple Stent Placements. Upon review of blood pressure over the past 5 days, systolic range from . Diastolic range from 41-62.    Past Medical and Surgical History reviewed in Epic today.    MEDICATIONS:  Current Outpatient Medications   Medication Sig Dispense Refill     acetaminophen (TYLENOL) 325 MG tablet Take 650 mg by mouth every 6 hours as needed for mild pain       ASPIRIN NOT PRESCRIBED (INTENTIONAL) Please choose reason not prescribed, below 0 each 0     atorvastatin (LIPITOR) 20 MG tablet Take 20 mg by  mouth At Bedtime        furosemide (LASIX) 40 MG tablet Take 1 tablet (40 mg) by mouth every morning 30 tablet      lisinopril (PRINIVIL/ZESTRIL) 10 MG tablet Take 10 mg by mouth At Bedtime        metoprolol succinate (TOPROL-XL) 25 MG 24 hr tablet Take 25 mg by mouth At Bedtime        Warfarin Sodium (COUMADIN PO) Take by mouth daily Take as directed per INR results       REVIEW OF SYSTEMS:  4 point ROS including Respiratory, CV, GI and , other than that noted in the HPI,  is negative    Objective:  /54   Pulse 60   Temp 98.4  F (36.9  C)   Resp 17   Wt 76 kg (167 lb 8 oz)   SpO2 98%   BMI 22.72 kg/m    Exam:  GENERAL APPEARANCE:  Alert, in no distress  ENT:  Mouth and posterior oropharynx normal, moist mucous membranes  EYES:  EOM, conjunctivae, lids, pupils and irises normal  RESP:  respiratory effort and palpation of chest normal, lungs clear to auscultation , no respiratory distress  CV:  Palpation and auscultation of heart done , regular rate and rhythm, no murmur, rub, or gallop  ABDOMEN:  normal bowel sounds, soft, nontender, no hepatosplenomegaly or other masses  M/S:   Active movement of bilateral upper and lower extremities. Trace edema in BLE.  SKIN: Dressing on top of head. Swelling noted in left upper eyelid.   NEURO:   Cranial nerves 2-12 are normal tested and grossly at patient's baseline  PSYCH:  affect and mood normal     Labs:   Labs done in SNF are in [place] EPIC. Please refer to them using EPIC/Care Everywhere.    ASSESSMENT/PLAN:  Fall. On 4/18/19 while attempting to reach forward from edge of bed, hitting head. Neuros intact. No headache. No changes in vision. Dressing applied to abrasions on top of head. Encouraged to request assistance from staff in the future if needed.     Chronic Atrial Fibrillation on Anticoagulation. INR therapeutic. Continue Warfarin 1 mg on M, W, F and 2 mg on all other days. Repeat INR on 4/29/19 to ensure stability. Takes Metoprolol.     Coronary  Artery Disease S/P Multiple Stent Placements. Most blood pressures < 140/90. Continue Lisinopril and Metoprolol as ordered. Also on Furosemide. Monitor blood pressure weekly.     Electronically signed by:  MATTIE Baron CNP

## 2019-04-30 NOTE — PROGRESS NOTES
[facility]  [place] Medical Record Number:  [id number]  Place of Service where encounter took place:  [facility] ([facility]) [[id number]]  Chief Complaint   Patient presents with     Nursing Home Acute     HPI:    Bret Merino  is a 89 year old (6/17/1925), who is being seen today for an episodic care visit.  HPI information obtained from: facility chart records, facility staff, patient report and [facility] chart review. Today's concern is:    Recurrent Falls. Received report from Care Coordinator on 4/27/19, found seated on the floor between the bed and side table with 4WW next to him. Was trying to sit on edge of bed and slid. Requesting Physical Therapy orders. Today, patient reports he was trying to instruct staff on which way to get out of bed and they weren't listening. Reports staff make him wear  socks when he walks. Is not sure he would be interested in participating in therapy. Had 1 fall at his previous residence and was unable to get up on his own. Denies dizziness or loss of consciousness.      Chronic Atrial Fibrillation on Anticoagulation. INR 2.5 on 4/29/19. Previous INRs are as follows:     INR 2.6 on 4/22/19. Warfarin 1 mg on M, W, F and 2 mg all other days  INR 2.6 on 4/18/19. Warfarin 1 mg and 2 mg alternating every other day   INR 1.8 on 4/16/19 Warfarin 2.5 mg POdaily  INR 2.1 on 4/11/19 Warfarin 1.5 mg PO daily  INR 1.4 on 4/4/19 Warfarin 1.5 mg PO daily  INR 1.7 on 4/1/19 Warfarin 1 mg PO daily  INR 3.0 on 3/28/19 Warfarin 0.5 mg PO daily   INR 3.3 on 3/26/19 Warfarin 1 mg PO daily  INR 3.9 on 3/25/19 Warfarin held     Cervicalgia. Denies pain. Upon review of documentation, has utilized Acetaminophen 2-3 times in the past 48 hours.     Past Medical and Surgical History reviewed in Epic today.    MEDICATIONS:  Current Outpatient Medications   Medication Sig Dispense Refill     acetaminophen (TYLENOL) 325 MG tablet Take 650 mg by mouth every 6 hours as needed  for mild pain       ASPIRIN NOT PRESCRIBED (INTENTIONAL) Please choose reason not prescribed, below 0 each 0     atorvastatin (LIPITOR) 20 MG tablet Take 20 mg by mouth At Bedtime        furosemide (LASIX) 40 MG tablet Take 1 tablet (40 mg) by mouth every morning 30 tablet      lisinopril (PRINIVIL/ZESTRIL) 10 MG tablet Take 10 mg by mouth At Bedtime        metoprolol succinate (TOPROL-XL) 25 MG 24 hr tablet Take 25 mg by mouth At Bedtime        Warfarin Sodium (COUMADIN PO) Take by mouth daily Take as directed per INR results       REVIEW OF SYSTEMS:  4 point ROS including Respiratory, CV, GI and , other than that noted in the HPI,  is negative    Objective:  /48   Pulse 63   Temp 98.2  F (36.8  C)   Resp 20   Wt 88 kg (193 lb 14.4 oz)   SpO2 99%   BMI 26.30 kg/m    Exam:  GENERAL APPEARANCE:  Alert, in no distress  ENT:  Mouth and posterior oropharynx normal, moist mucous membranes  EYES:  EOM, conjunctivae, lids, pupils and irises normal  RESP:  respiratory effort and palpation of chest normal, lungs clear to auscultation , no respiratory distress  CV:  Palpation and auscultation of heart done , regular rate and rhythm, no murmur, rub, or gallop  ABDOMEN:  normal bowel sounds, soft, nontender, no hepatosplenomegaly or other masses  M/S:   Active movement of bilateral upper and lower extremities. Trace edema in BLE.  SKIN: Inspection at baseline  NEURO:   Cranial nerves 2-12 are normal tested and grossly at patient's baseline  PSYCH:  affect and mood normal     Labs:   Labs done in SNF are in [facility]. Please refer to them using [place]/Care Everywhere.    ASSESSMENT/PLAN:  Recurrent Falls. With falls noted on 4/27/19 and 4/18/19. No reports of dizziness or loss of consciousness. Has bare feet while sitting in bed, but reports staff require him to wear socks with  when ambulating. Blood pressure and heart rates stable. On no medications for diabetes. Physical Therapy ordered. Further plans  pending recommendations from PT.  If falls continue to be recurrent, may need to consider benefits versus risks of anticoagulation.     Chronic Atrial Fibrillation on Anticoagulation. INR therapeutic. Continue Warfarin 1 mg on M, W, F and 2 mg on all other days. Repeat INR on 5/7/19 to ensure stability. Takes Metoprolol.     Cervicalgia. No reports of pain. Continue Acetaminophen as ordered.     Electronically signed by:  MATTIE Baron CNP }

## 2019-04-30 NOTE — LETTER
4/30/2019        RE: Bret Merino  [address]  [place] [address]        [facility]  [place] Medical Record Number:  [id number]  Place of Service where encounter took place:  [facility] ([facility]) [[id number]]  Chief Complaint   Patient presents with     Nursing Home Acute     HPI:    Bret Merino  is a 89 year old (6/17/1925), who is being seen today for an episodic care visit.  HPI information obtained from: facility chart records, facility staff, patient report and [facility] chart review. Today's concern is:    Recurrent Falls. Received report from Care Coordinator on 4/27/19, found seated on the floor between the bed and side table with 4WW next to him. Was trying to sit on edge of bed and slid. Requesting Physical Therapy orders. Today, patient reports he was trying to instruct staff on which way to get out of bed and they weren't listening. Reports staff make him wear  socks when he walks. Is not sure he would be interested in participating in therapy. Had 1 fall at his previous residence and was unable to get up on his own. Denies dizziness or loss of consciousness.      Chronic Atrial Fibrillation on Anticoagulation. INR 2.5 on 4/29/19. Previous INRs are as follows:     INR 2.6 on 4/22/19. Warfarin 1 mg on M, W, F and 2 mg all other days  INR 2.6 on 4/18/19. Warfarin 1 mg and 2 mg alternating every other day   INR 1.8 on 4/16/19 Warfarin 2.5 mg POdaily  INR 2.1 on 4/11/19 Warfarin 1.5 mg PO daily  INR 1.4 on 4/4/19 Warfarin 1.5 mg PO daily  INR 1.7 on 4/1/19 Warfarin 1 mg PO daily  INR 3.0 on 3/28/19 Warfarin 0.5 mg PO daily   INR 3.3 on 3/26/19 Warfarin 1 mg PO daily  INR 3.9 on 3/25/19 Warfarin held     Cervicalgia. Denies pain. Upon review of documentation, has utilized Acetaminophen 2-3 times in the past 48 hours.     Past Medical and Surgical History reviewed in Epic today.    MEDICATIONS:  Current Outpatient Medications   Medication Sig Dispense Refill      acetaminophen (TYLENOL) 325 MG tablet Take 650 mg by mouth every 6 hours as needed for mild pain       ASPIRIN NOT PRESCRIBED (INTENTIONAL) Please choose reason not prescribed, below 0 each 0     atorvastatin (LIPITOR) 20 MG tablet Take 20 mg by mouth At Bedtime        furosemide (LASIX) 40 MG tablet Take 1 tablet (40 mg) by mouth every morning 30 tablet      lisinopril (PRINIVIL/ZESTRIL) 10 MG tablet Take 10 mg by mouth At Bedtime        metoprolol succinate (TOPROL-XL) 25 MG 24 hr tablet Take 25 mg by mouth At Bedtime        Warfarin Sodium (COUMADIN PO) Take by mouth daily Take as directed per INR results       REVIEW OF SYSTEMS:  4 point ROS including Respiratory, CV, GI and , other than that noted in the HPI,  is negative    Objective:  /48   Pulse 63   Temp 98.2  F (36.8  C)   Resp 20   Wt 88 kg (193 lb 14.4 oz)   SpO2 99%   BMI 26.30 kg/m     Exam:  GENERAL APPEARANCE:  Alert, in no distress  ENT:  Mouth and posterior oropharynx normal, moist mucous membranes  EYES:  EOM, conjunctivae, lids, pupils and irises normal  RESP:  respiratory effort and palpation of chest normal, lungs clear to auscultation , no respiratory distress  CV:  Palpation and auscultation of heart done , regular rate and rhythm, no murmur, rub, or gallop  ABDOMEN:  normal bowel sounds, soft, nontender, no hepatosplenomegaly or other masses  M/S:   Active movement of bilateral upper and lower extremities. Trace edema in BLE.  SKIN:  Inspection at baseline  NEURO:   Cranial nerves 2-12 are normal tested and grossly at patient's baseline  PSYCH:  affect and mood normal     Labs:   Labs done in SNF are in [place] EPIC. Please refer to them using EPIC/Care Everywhere.    ASSESSMENT/PLAN:  Recurrent Falls. With falls noted on 4/27/19 and 4/18/19. No reports of dizziness or loss of consciousness. Has bare feet while sitting in bed, but reports staff require him to wear socks with  when ambulating. Blood pressure and heart  rates stable. On no medications for diabetes. Physical Therapy ordered. Further plans pending recommendations from PT.  If falls continue to be recurrent, may need to consider benefits versus risks of anticoagulation.     Chronic Atrial Fibrillation on Anticoagulation. INR therapeutic. Continue Warfarin 1 mg on M, W, F and 2 mg on all other days. Repeat INR on 5/7/19 to ensure stability. Takes Metoprolol.     Cervicalgia. No reports of pain. Continue Acetaminophen as ordered.     Electronically signed by:  MATTIE Baron CNP }            Sincerely,        MATTIE Baron CNP

## 2019-05-09 NOTE — PROGRESS NOTES
"[facility] [facility]  [place] Medical Record Number:  [id number]  Place of Service where encounter took place:  [facility] ([facility]) [[id number]]  Chief Complaint   Patient presents with     Nursing Home Acute     HPI:    Bret Merino  is a 89 year old (6/17/1925), who is being seen today for an episodic care visit.  HPI information obtained from: facility chart records, facility staff, patient report and [facility] chart review. Today's concern is:    Pulmonary Infiltrate. On 5/8/19, received report from staff patient wasn't feeling well and requested to go to the hospital. Blood pressure 97/44, Temperature 99.5 F. Noted to have crackles in lungs. Ordered a chest x-ray which revealed \"Reticulonodular opacities are seen in the right lower lobe suggestive of developing infiltrate, follow-up recommended\". Today, patient denies shortness of breath or cough.    Bilateral Lower Extremity Edema. Received telephone call from staff earlier in the day on 5/8/19 regarding patient's weeping lower extremity edema. Ordered compression wraps with ACE bandages as there is no therapist in the building who can perform lymphedema treatment. Upon review of weights, 188.5 lbs on 5/9/19 -> 194.7 lbs on 5/2/19 -> 194.5 lbs on 3/2/19. Patient does not like elevating legs in bed during the day. Prefers to sit on the side of the bed with legs in a dependent position. Has some pain in his legs relieved with Acetaminophen.     Recurrent Falls. On 5/8/19 at 2102, found on the floor next to bed. Was trying to sit on edge of bed and lost balance.Care Coordinator requesting bilateral grab bars in bed. Received report from Care Coordinator on 4/27/19, found seated on the floor between the bed and side table with 4WW next to him. Was trying to sit on edge of bed and slid. Requested Physical Therapy orders.Also had fall on 4/18/19. Per staff report, patient attempts to sit on edge of the bed throughout the day and loses his " balance. During visit, patient repeatedly requests to be pulled up into bed. Falls backward into the bed throughout visit.     Anemia. Hemoglobin 8.7 on 5/9/19. Previous Hemoglobin 10.6 on 3/6/19. Noted to have hematuria intermittently since admission to facility. No other obvious sources of blood loss.     Acute Kidney Injury. Creatinine 1.12 on 5/9/19. Previous Creatinine 0.78 on 3/6/19.     Goals of Care. Following today's visit, received report from nurse manager patient's son was visiting and wanted to discuss hospice as his father has been declining.     Past Medical and Surgical History reviewed in Epic today.    MEDICATIONS:  Current Outpatient Medications   Medication Sig Dispense Refill     acetaminophen (TYLENOL) 325 MG tablet Take 650 mg by mouth every 6 hours as needed for mild pain       ASPIRIN NOT PRESCRIBED (INTENTIONAL) Please choose reason not prescribed, below 0 each 0     atorvastatin (LIPITOR) 20 MG tablet Take 20 mg by mouth At Bedtime        furosemide (LASIX) 40 MG tablet Take 1 tablet (40 mg) by mouth every morning 30 tablet      lisinopril (PRINIVIL/ZESTRIL) 10 MG tablet Take 10 mg by mouth At Bedtime        metoprolol succinate (TOPROL-XL) 25 MG 24 hr tablet Take 25 mg by mouth At Bedtime        Warfarin Sodium (COUMADIN PO) Take by mouth daily Take as directed per INR results       REVIEW OF SYSTEMS:  4 point ROS including Respiratory, CV, GI and , other than that noted in the HPI,  is negative    Objective:  BP 94/43   Pulse 69   Temp 99.5  F (37.5  C)   Resp 18   Wt 86.9 kg (191 lb 9.6 oz)   SpO2 95%   BMI 25.99 kg/m    Exam:  GENERAL APPEARANCE:  Alert, in no distress  ENT:  Mouth and posterior oropharynx normal, moist mucous membranes  EYES:  EOM, conjunctivae, lids, pupils and irises normal  RESP:  respiratory effort and palpation of chest normal, coarse lung sounds noted in bilateral upper lobes , no respiratory distress  CV:  Palpation and auscultation of heart done ,  regular rate and rhythm, no murmur, rub, or gallop  ABDOMEN:  normal bowel sounds, soft, nontender, no hepatosplenomegaly or other masses  M/S:   Active movement of bilateral upper and lower extremities. 2+ edema in BLE.  SKIN: Inspection at baseline  NEURO:   Cranial nerves 2-12 are normal tested and grossly at patient's baseline  PSYCH:  affect and mood normal     Labs:   Labs done in SNF are in [place] EPIC. Please refer to them using [place]/Care Everywhere.    ASSESSMENT/PLAN:  Pulmonary Infiltrate. Developing infiltrate in RLL. History of cardiomyopathy with EF 55% on 2/23/19. Temperature 99.5 F yesterday, but 97.8 F this morning. Weights trending down, but given increase in edema with coarse lung sounds, will order Furosemide 40 mg BID x 3 days. Resume Furosemide 40 mg daily thereafter. Also ordered DuoNebs TID x 3 days due to visibly labored breathing.    Bilateral Lower Extremity Edema. Weights trending down, but developed infiltrate as noted above. Has history of cardiomyopathy with EF 55% on 2/23/19. Ordered compression wraps to be applied by nursing staff daily. Encouraged elevation while at rest.     Recurrent Falls. Noted to slide off bed on 5/8/19 and 4/27/19. Physical Therapy and grab bars ordered. As patient falls while attempting to sit on edge of bed, discussed with patient and staff sitting in a chair during the day to decrease risk of sliding off bed. May also be due to overall weakness secondary to prostate cancer with probable bone metastasis.     Anemia. Recent baseline Hemoglobin ~ 10-11. Hemoglobin down slightly from baseline. Has intermittent hematuria. No other obvious blood loss. Patient and family have seen Urology. Are not interested in further work-up for hematuria or probable metastatic prostate cancer at this time. Will repeat CBC on 5/13/19 to ensure stability.     Acute Kidney Injury. Will repeat BMP on 5/13/19 to ensure stability given increase in Furosemide.    Goals of Care.  Attempted to meet with son in patient's room, but son was no longer present. Left message on son's voicemail to discuss hospice and potential enrollment.     Electronically signed by:  MATTIE Baron CNP

## 2019-05-09 NOTE — LETTER
"    5/9/2019        RE: Bret Merino  [address]  [facility] [address]        [place] [facility]  [place] [facility] Record Number:  [id number]  Place of Service where encounter took place:  [facility] [[id number]]  Chief Complaint   Patient presents with     Nursing Home Acute     HPI:    Bret Merino  is a 89 year old (6/17/1925), who is being seen today for an episodic care visit.  HPI information obtained from: facility chart records, facility staff, patient report and [facility] chart review. Today's concern is:    Pulmonary Infiltrate. On 5/8/19, received report from staff patient wasn't feeling well and requested to go to the hospital. Blood pressure 97/44, Temperature 99.5 F. Noted to have crackles in lungs. Ordered a chest x-ray which revealed \"Reticulonodular opacities are seen in the right lower lobe suggestive of developing infiltrate, follow-up recommended\". Today, patient denies shortness of breath or cough.    Bilateral Lower Extremity Edema. Received telephone call from staff earlier in the day on 5/8/19 regarding patient's weeping lower extremity edema. Ordered compression wraps with ACE bandages as there is no therapist in the building who can perform lymphedema treatment. Upon review of weights, 188.5 lbs on 5/9/19 -> 194.7 lbs on 5/2/19 -> 194.5 lbs on 3/2/19. Patient does not like elevating legs in bed during the day. Prefers to sit on the side of the bed with legs in a dependent position. Has some pain in his legs relieved with Acetaminophen.     Recurrent Falls. On 5/8/19 at 2102, found on the floor next to bed. Was trying to sit on edge of bed and lost balance.Care Coordinator requesting bilateral grab bars in bed. Received report from Care Coordinator on 4/27/19, found seated on the floor between the bed and side table with 4WW next to him. Was trying to sit on edge of bed and slid. Requested Physical Therapy orders.Also had fall on 4/18/19. Per staff " report, patient attempts to sit on edge of the bed throughout the day and loses his balance. During visit, patient repeatedly requests to be pulled up into bed. Falls backward into the bed throughout visit.     Anemia. Hemoglobin 8.7 on 5/9/19. Previous Hemoglobin 10.6 on 3/6/19. Noted to have hematuria intermittently since admission to facility. No other obvious sources of blood loss.     Acute Kidney Injury. Creatinine 1.12 on 5/9/19. Previous Creatinine 0.78 on 3/6/19.     Goals of Care. Following today's visit, received report from nurse manager patient's son was visiting and wanted to discuss hospice as his father has been declining.     Past Medical and Surgical History reviewed in Epic today.    MEDICATIONS:  Current Outpatient Medications   Medication Sig Dispense Refill     acetaminophen (TYLENOL) 325 MG tablet Take 650 mg by mouth every 6 hours as needed for mild pain       ASPIRIN NOT PRESCRIBED (INTENTIONAL) Please choose reason not prescribed, below 0 each 0     atorvastatin (LIPITOR) 20 MG tablet Take 20 mg by mouth At Bedtime        furosemide (LASIX) 40 MG tablet Take 1 tablet (40 mg) by mouth every morning 30 tablet      lisinopril (PRINIVIL/ZESTRIL) 10 MG tablet Take 10 mg by mouth At Bedtime        metoprolol succinate (TOPROL-XL) 25 MG 24 hr tablet Take 25 mg by mouth At Bedtime        Warfarin Sodium (COUMADIN PO) Take by mouth daily Take as directed per INR results       REVIEW OF SYSTEMS:  4 point ROS including Respiratory, CV, GI and , other than that noted in the HPI,  is negative    Objective:  BP 94/43   Pulse 69   Temp 99.5  F (37.5  C)   Resp 18   Wt 86.9 kg (191 lb 9.6 oz)   SpO2 95%   BMI 25.99 kg/m     Exam:  GENERAL APPEARANCE:  Alert, in no distress  ENT:  Mouth and posterior oropharynx normal, moist mucous membranes  EYES:  EOM, conjunctivae, lids, pupils and irises normal  RESP:  respiratory effort and palpation of chest normal, coarse lung sounds noted in bilateral upper  lobes , no respiratory distress  CV:  Palpation and auscultation of heart done , regular rate and rhythm, no murmur, rub, or gallop  ABDOMEN:  normal bowel sounds, soft, nontender, no hepatosplenomegaly or other masses  M/S:   Active movement of bilateral upper and lower extremities. 2+ edema in BLE.  SKIN: Inspection at baseline  NEURO:   Cranial nerves 2-12 are normal tested and grossly at patient's baseline  PSYCH:  affect and mood normal     Labs:   [facility]F [facility]. Please refer to them using [employer] Everywhere.    ASSESSMENT/PLAN:  Pulmonary Infiltrate. Developing infiltrate in RLL. History of cardiomyopathy with EF 55% on 2/23/19. Temperature 99.5 F yesterday, but 97.8 F this morning. Weights trending down, but given increase in edema with coarse lung sounds, will order Furosemide 40 mg BID x 3 days. Resume Furosemide 40 mg daily thereafter. Also ordered DuoNebs TID x 3 days due to visibly labored breathing.    Bilateral Lower Extremity Edema. Weights trending down, but developed infiltrate as noted above. Has history of cardiomyopathy with EF 55% on 2/23/19. Ordered compression wraps to be applied by nursing staff daily. Encouraged elevation while at rest.     Recurrent Falls. Noted to slide off bed on 5/8/19 and 4/27/19. Physical Therapy and grab bars ordered. As patient falls while attempting to sit on edge of bed, discussed with patient and staff sitting in a chair during the day to decrease risk of sliding off bed. May also be due to overall weakness secondary to prostate cancer with probable bone metastasis.     Anemia. Recent baseline Hemoglobin ~ 10-11. Hemoglobin down slightly from baseline. Has intermittent hematuria. No other obvious blood loss. Patient and family have seen Urology. Are not interested in further work-up for hematuria or probable metastatic prostate cancer at this time. Will repeat CBC on 5/13/19 to ensure stability.     Acute Kidney Injury. Will repeat BMP on  5/13/19 to ensure stability given increase in Furosemide.    Goals of Care. Attempted to meet with son in patient's room, but son was no longer present. Left message on son's voicemail to discuss hospice and potential enrollment.     Electronically signed by:  MATTIE Baron CNP             Sincerely,        MATTIE Baron CNP

## 2019-05-10 NOTE — LETTER
5/10/2019        RE: Bret Merino  [address]  [facility] [address]        [place] [facility]  Nursing Home Regulatory Visit  May 10, 2019    Chief Complaint   Patient presents with     [facility] Regulatory       HPI:    Bret Merino is a 89 year old  (6/17/1925), who is being seen today for a federally mandated E/M visit at [facility]. Today's concerns are:    1) Metastatic Prostate Cancer -- remote history of prostate cancer. Found to have multiple lytic bone lesions in February along with a PSA of 59. Family did not wish oncology follow up. He has had a clinical decline. Weights are down about 5 lbs since February. He has anemia, renal failure, LE edema, failure to thrive and is falling a lot. Now also with a R base PNA (see below).   2) Fever - low grade .4. CXR with developing R base infiltrate. No hypoxia. May be aspiration given overall decline. Family has elected not to treat with antibiotics given overall clinical decline and plan is to enroll him in hospice.   3) Encephalopathy -- today, oriented to self and month, thought year was 2018. Alertness was waxing and waning. I did not talk with him about hospice as I do not think he was tracking our conversation.   4) LE Edema - weeping. Legs wrapped in Kerlix today. Has been difficult get get compression in place as there is nobody who can do lymphedema wraps at the facility. Likely due to low oncotic state, not CHF. Weights are trending down (as above).  5) Anemia -- Hgb has drifted to 8.7 from 10.6 in mid March. Likely due to progressive malignancy, poor PO intake.   6) RAMIRO - mild. Cr 1.12 earlier this week in setting of clinical decline.   7) Frequent Falls - most recently this morning. Has a 5-6 cm laceration across the top of his head as well as a L knee abrasion    ALLERGIES: Naproxen    PAST MEDICAL HISTORY:   Past Medical History:   Diagnosis Date     A-fib (H)      CAD (coronary artery disease)      CHF  (congestive heart failure) (H)      Dyslipidemia      History of prediabetes      Hypertension      Pneumonia 01/2018     Prostate cancer (H)      Skin cancer        PAST SURGICAL HISTORY:   Past Surgical History:   Procedure Laterality Date     ARTHROPLASTY KNEE BILATERAL       ARTHROPLASTY SHOULDER Right      AS TOTAL HIP ARTHROPLASTY Right      H CORONARY INTERVENTION      stentin for CAD     IMPLANT PACEMAKER       ORCHIECTOMY SCROTAL BILATERAL      treatment of prostate cancer       FAMILY HISTORY:   No family history on file.    SOCIAL HISTORY:   Lives in a SNF    MEDICATIONS:  Current Outpatient Medications   Medication Sig Dispense Refill     acetaminophen (TYLENOL) 325 MG tablet Take 650 mg by mouth every 6 hours as needed for mild pain       ASPIRIN NOT PRESCRIBED (INTENTIONAL) Please choose reason not prescribed, below 0 each 0     atorvastatin (LIPITOR) 20 MG tablet Take 20 mg by mouth At Bedtime        furosemide (LASIX) 40 MG tablet Take 1 tablet (40 mg) by mouth every morning       ipratropium - albuterol 0.5 mg/2.5 mg/3 mL (DUONEB) 0.5-2.5 (3) MG/3ML neb solution Take 1 vial by nebulization 3 times daily       lisinopril (PRINIVIL/ZESTRIL) 10 MG tablet Take 10 mg by mouth At Bedtime        LORazepam (ATIVAN) 0.5 MG tablet Take 1 tablet (0.5 mg) by mouth every 4 hours as needed for agitation, anxiety or pain 30 tablet 1     metoprolol succinate (TOPROL-XL) 25 MG 24 hr tablet Take 25 mg by mouth At Bedtime        morphine 10 MG/5ML solution Take 1.25 mLs (2.5 mg) by mouth every 4 hours as needed for pain 40 mL 0     Warfarin Sodium (COUMADIN PO) Take by mouth daily Take as directed per INR results         Medications reviewed:  Medications reconciled to facility chart and changes were made to reflect current medications as identified as above med list. Below are the changes that were made:   Medications stopped since last EPIC medication reconciliation:   There are no discontinued  medications.  Medications started since last [facility] medication reconciliation:  No orders of the defined types were placed in this encounter.      Case Management:  I have reviewed the care plan and MDS and do agree with the plan.   Information reviewed:  Medications, vital signs, orders, and nursing notes.    ROS:  4 point ROS neg other than the symptoms noted above in the HPI    PHYSICAL EXAM:  BP 99/56   Pulse 71   Temp 97.4  F (36.3  C)   Resp 18   Wt 85.5 kg (188 lb 8 oz)   SpO2 96%   BMI 25.57 kg/m     Gen: sitting across bed, variable alertness, in no acute distress but does not appear at baseline   HEENT: large laceration (5-6 cm) across top of his head, that is oozing blood  Card: RRR, S1, S2, no murmurs  Resp: lungs with few scattered crackles, no wheezing  GI: abdomen soft, not-tender  Ext: bilateral 2+ LE edema, weeping, Kerlix wraps in place to knees   Neuro: CX II-XII grossly in tact; ROM in all four extremities grossly in tact  Psych: alert and oriented to self and month, not year    Lab/Diagnostic data:  Reviewed as per Epic    ASSESSMENT/PLAN    1) Metastatic Prostate Cancer  2) Fever   3) Encephalopathy  4) LE Edema   5) Anemia   6) RAMIRO    7) Frequent Falls     Overall with significant clinical decline in setting of metastatic malignancy, advanced age and now a R base pneumonia. Family has elected to focus on comfort and [facility] has been consulted. No antibiotics for the PNA. No labs. No hospitalization. Morphine and lorazepam have been ordered for comfort. Will continue de-escalate other meds upon hospice enrollment. Given the frequent falls, would favor discontinuation of warfarin sooner rather than later.       Electronically signed by:  Kym Buchanan MD        Sincerely,        Kym Buchanan MD

## 2019-05-10 NOTE — TELEPHONE ENCOUNTER
nsg called - pt had a fall at 0600 - hit his head (skin tear 10 cm x 7 cm) - bleeding. , elbow and L knee.   nsg noted new acute fever at 100.4 and recheck after tylenol still over 100  102/46  RR 26  Pulse 68  Noted recent CXR several days ago was negative.   nsg noted tera puplis were 2 on first 2 neuro checks now 3, but bilaterl and no other neuro difference.       nsg believes he needs to be sent to ER.     Orders - send to ER for further w/u.

## 2019-05-10 NOTE — TELEPHONE ENCOUNTER
Received report from staff patient had a fall out of bed. Noted to be more confused with temperature 100.4 F.     Spoke to son, Len, from 0738 to 0748 who reports patient is declining. More weak. Concerned about care at facility. Is 89 years old and has lived a good life. Packed his bags last week and said he was ready to go. Believed this meant his father was ready to go to [facility]. Would like to have hospice involved. Agreeable to [facility]. Would prefer not to treat pneumonia with antibiotics. Is in agreement with starting comfort medications.     Spoke to RICHA, daughter Charmaine from 0749 to 0758. Educated on hospice. Is in agreement with [facility] hospice. Ok with [facility]. Also elects not to treat pneumonia with antibiotics. Would like comfort medications such as Morphine Sulfate and Lorazepam.     Will order [facility]. Will also initiate Morphine Sulfate and Lorazepam for comfort. Will discontinue Furosemide 40 mg BID as infiltrate on chest x-ray is probable pneumonia. Family prefers not to treat with antibiotics.

## 2019-05-10 NOTE — PROGRESS NOTES
[place] [facility]  Nursing Home Regulatory Visit  May 10, 2019    Chief Complaint   Patient presents with     [facility] Regulatory       HPI:    Bret Merino is a 89 year old  (6/17/1925), who is being seen today for a federally mandated E/M visit at [facility]. Today's concerns are:    1) Metastatic Prostate Cancer -- remote history of prostate cancer. Found to have multiple lytic bone lesions in February along with a PSA of 59. Family did not wish oncology follow up. He has had a clinical decline. Weights are down about 5 lbs since February. He has anemia, renal failure, LE edema, failure to thrive and is falling a lot. Now also with a R base PNA (see below).   2) Fever - low grade .4. CXR with developing R base infiltrate. No hypoxia. May be aspiration given overall decline. Family has elected not to treat with antibiotics given overall clinical decline and plan is to enroll him in hospice.   3) Encephalopathy -- today, oriented to self and month, thought year was 2018. Alertness was waxing and waning. I did not talk with him about hospice as I do not think he was tracking our conversation.   4) LE Edema - weeping. Legs wrapped in Kerlix today. Has been difficult get get compression in place as there is nobody who can do lymphedema wraps at the facility. Likely due to low oncotic state, not CHF. Weights are trending down (as above).  5) Anemia -- Hgb has drifted to 8.7 from 10.6 in mid March. Likely due to progressive malignancy, poor PO intake.   6) RAMIRO - mild. Cr 1.12 earlier this week in setting of clinical decline.   7) Frequent Falls - most recently this morning. Has a 5-6 cm laceration across the top of his head as well as a L knee abrasion    ALLERGIES: Naproxen    PAST MEDICAL HISTORY:   Past Medical History:   Diagnosis Date     A-fib (H)      CAD (coronary artery disease)      CHF (congestive heart failure) (H)      Dyslipidemia      History of prediabetes       Hypertension      Pneumonia 01/2018     Prostate cancer (H)      Skin cancer        PAST SURGICAL HISTORY:   Past Surgical History:   Procedure Laterality Date     ARTHROPLASTY KNEE BILATERAL       ARTHROPLASTY SHOULDER Right      AS TOTAL HIP ARTHROPLASTY Right      H CORONARY INTERVENTION      stentin for CAD     IMPLANT PACEMAKER       ORCHIECTOMY SCROTAL BILATERAL      treatment of prostate cancer       FAMILY HISTORY:   No family history on file.    SOCIAL HISTORY:   Lives in a SNF    MEDICATIONS:  Current Outpatient Medications   Medication Sig Dispense Refill     acetaminophen (TYLENOL) 325 MG tablet Take 650 mg by mouth every 6 hours as needed for mild pain       ASPIRIN NOT PRESCRIBED (INTENTIONAL) Please choose reason not prescribed, below 0 each 0     atorvastatin (LIPITOR) 20 MG tablet Take 20 mg by mouth At Bedtime        furosemide (LASIX) 40 MG tablet Take 1 tablet (40 mg) by mouth every morning       ipratropium - albuterol 0.5 mg/2.5 mg/3 mL (DUONEB) 0.5-2.5 (3) MG/3ML neb solution Take 1 vial by nebulization 3 times daily       lisinopril (PRINIVIL/ZESTRIL) 10 MG tablet Take 10 mg by mouth At Bedtime        LORazepam (ATIVAN) 0.5 MG tablet Take 1 tablet (0.5 mg) by mouth every 4 hours as needed for agitation, anxiety or pain 30 tablet 1     metoprolol succinate (TOPROL-XL) 25 MG 24 hr tablet Take 25 mg by mouth At Bedtime        morphine 10 MG/5ML solution Take 1.25 mLs (2.5 mg) by mouth every 4 hours as needed for pain 40 mL 0     Warfarin Sodium (COUMADIN PO) Take by mouth daily Take as directed per INR results         Medications reviewed:  Medications reconciled to facility chart and changes were made to reflect current medications as identified as above med list. Below are the changes that were made:   Medications stopped since last EPIC medication reconciliation:   There are no discontinued medications.  Medications started since last [facility] medication reconciliation:  No orders of the defined  types were placed in this encounter.      Case Management:  I have reviewed the care plan and MDS and do agree with the plan.   Information reviewed:  Medications, vital signs, orders, and nursing notes.    ROS:  4 point ROS neg other than the symptoms noted above in the HPI    PHYSICAL EXAM:  BP 99/56   Pulse 71   Temp 97.4  F (36.3  C)   Resp 18   Wt 85.5 kg (188 lb 8 oz)   SpO2 96%   BMI 25.57 kg/m    Gen: sitting across bed, variable alertness, in no acute distress but does not appear at baseline   HEENT: large laceration (5-6 cm) across top of his head, that is oozing blood  Card: RRR, S1, S2, no murmurs  Resp: lungs with few scattered crackles, no wheezing  GI: abdomen soft, not-tender  Ext: bilateral 2+ LE edema, weeping, Kerlix wraps in place to knees   Neuro: CX II-XII grossly in tact; ROM in all four extremities grossly in tact  Psych: alert and oriented to self and month, not year    Lab/Diagnostic data:  Reviewed as per Epic    ASSESSMENT/PLAN    1) Metastatic Prostate Cancer  2) Fever   3) Encephalopathy  4) LE Edema   5) Anemia   6) RAMIRO    7) Frequent Falls     Overall with significant clinical decline in setting of metastatic malignancy, advanced age and now a R base pneumonia. Family has elected to focus on comfort and [facility] has been consulted. No antibiotics for the PNA. No labs. No hospitalization. Morphine and lorazepam have been ordered for comfort. Will continue de-escalate other meds upon hospice enrollment. Given the frequent falls, would favor discontinuation of warfarin sooner rather than later.       Electronically signed by:  Kym Buchanan MD

## 2023-03-27 NOTE — LETTER
3/8/2019        RE: Bret Merino  [address] [place] [address] [place] [address]        [facility]  INITIAL VISIT NOTE  March 8, 2019    PRIMARY CARE PROVIDER AND CLINIC:  Ad Brenner [address] [place] [address] / [facility] [id number]    Chief Complaint   Patient presents with     Hospital F/U     HPI:    Bret Merino is a 89 year old  (6/17/1925) male who was seen at [facility] on March 8, 2019 for an initial visit. Medical history is notable for CAD, ICM s/p AICD (2013), atrial fibrillation and remote prostate cancer s/p orchiectomy. He was hospitalized at [facility] from 2/22/19 to 2/26/19 where he presented with increasing weakness. Imaging with sclerotic densities in iliac bones concerning for metastatic prostate cancer. Clinically with mild decompensated CHF. TTE with EF 55% and he was diuresed and discharged on furosmeide. Also found to have an Enterococcus UTI and urinary retention and was discharged with a Morales in place. Labs notable for Hgb A1c 6.7 and he was given a diagnosis of DM II. He was admitted to this facility for medical management and rehab.     Today, Mr. Merino is seen in his room. PT was just about to take him to the gym for a session. He was able to get up from side of the bed and ambulate with his walker to the wheelchair and then sit down. Endorses feeling weak, but otherwise OK. No chest pain or dyspnea. No abdominal pain. No diarrhea or constipation.     CODE STATUS:   DNR / DNI    ALLERGIES:     Allergies   Allergen Reactions     Aleve [Naproxen]        PAST MEDICAL HISTORY:   Past Medical History:   Diagnosis Date     A-fib (H)      CAD (coronary artery disease)      CHF (congestive heart failure) (H)      Dyslipidemia      History of prediabetes      Hypertension      Pneumonia 01/2018     Prostate cancer (H)      Skin cancer        PAST SURGICAL HISTORY:   Past Surgical History:   Procedure Laterality Date     ARTHROPLASTY KNEE  BILATERAL       ARTHROPLASTY SHOULDER Right      AS TOTAL HIP ARTHROPLASTY Right      H CORONARY INTERVENTION      stentin for CAD     IMPLANT PACEMAKER       ORCHIECTOMY SCROTAL BILATERAL      treatment of prostate cancer       FAMILY HISTORY:   Reviewed and non-contributory    SOCIAL HISTORY:   Lives alone     MEDICATIONS:  Current Outpatient Medications   Medication Sig Dispense Refill     acetaminophen (TYLENOL) 325 MG tablet Take 650 mg by mouth every 6 hours as needed for mild pain       ASPIRIN NOT PRESCRIBED (INTENTIONAL) Please choose reason not prescribed, below 0 each 0     atorvastatin (LIPITOR) 20 MG tablet Take 20 mg by mouth At Bedtime        furosemide (LASIX) 40 MG tablet Take 1 tablet (40 mg) by mouth every morning 30 tablet      lisinopril (PRINIVIL/ZESTRIL) 10 MG tablet Take 10 mg by mouth At Bedtime        metoprolol succinate (TOPROL-XL) 25 MG 24 hr tablet Take 25 mg by mouth At Bedtime        Warfarin Sodium (COUMADIN PO) Take by mouth daily Take as directed per INR results           ROS:  10 point ROS neg other than the symptoms noted above in the HPI    PHYSICAL EXAM:  /66   Pulse 70   Temp 97  F (36.1  C)   Resp 18   Wt 88 kg (194 lb)   SpO2 97%   BMI 26.31 kg/m      Gen: sitting in wheelchair, alert, cooperative and in no acute distress  HEENT: normocephalic; oropharynx clear  Card: RRR, S1, S2, no murmurs  Resp: lungs clear to auscultation bilaterally, no crackles or wheezes  GI: abdomen soft, not-tender  : Morales draining clear yellow urine  MSK: normal muscle tone, some dependent LE edema  Neuro: CX II-XII grossly in tact; ROM in all four extremities grossly in tact  Psych: alert and oriented x3; normal affect    LABORATORY/IMAGING DATA:  Reviewed as per Epic    ASSESSMENT/PLAN:    Sclerotic Bone Lesions --> ?Metastatic Prostate Cancer  History of remote prostate cancer and imaging with concern for metastatic disease. PSA 59.   -- follow up with Dr. De La Cruz (med onc) and Dr RAMIRES  Margot (urology) as scheduled     Urinary Retention   In setting of UTI and ?metastatic prostate cancer. Morales placed and remains in place  -- routine Morales cares  -- follow up with urology as scheduled     Enterococcus UTI  Completed course of amoxicillin on 3/4. Afebrile. Urine clear.   -- follow clinically     CAD / Ischemic Cardiomyopathy s/p AICD (2013) / HTN / HLD  SBPs 110s-120s. Weights stable around 194 lbs. TTE during hospitalization with EF 55%. New on furosemide.   -- continues on atorvastatin 20 mg at bedtime, furosemide 40 mg daily, lisinopril 10 mg at bedtime and metoprolol XL 25 mg at bedtime  -- follow BPs, weights, clinical volume status    Chronic Atrial Fibrillation  HR 60s-80s. Had a supratherapeutic INR yesterday and received Vit K. INR 4.4 today  -- rate controlled with metoprolol XL 25 mg at bedtime   -- hold warfarin 3/8 and 3/9  -- next INR 3/10 - if close to or <3 would go ahead and restart at 2.5 mg daily with INR on 3/13    DM, Type II - NEW Diagnosis  Hgb A1c 6.7. Goal A1c is <8  -- diet control  -- could consider recheck A1c in 3 mos per PCP    Physical Deconditioning  In setting of hospitalization and underlying medical conditions  -- ongoing PT/OT      Electronically signed by:  Kym Buchanan MD                        Sincerely,        Kym Buchanan MD     Qbrexza Counseling:  I discussed with the patient the risks of Qbrexza including but not limited to headache, mydriasis, blurred vision, dry eyes, nasal dryness, dry mouth, dry throat, dry skin, urinary hesitation, and constipation.  Local skin reactions including erythema, burning, stinging, and itching can also occur.